# Patient Record
Sex: MALE | Race: WHITE | NOT HISPANIC OR LATINO | URBAN - METROPOLITAN AREA
[De-identification: names, ages, dates, MRNs, and addresses within clinical notes are randomized per-mention and may not be internally consistent; named-entity substitution may affect disease eponyms.]

---

## 2017-03-17 ENCOUNTER — COMMUNICATION - HEALTHEAST (OUTPATIENT)
Dept: SCHEDULING | Facility: CLINIC | Age: 82
End: 2017-03-17

## 2017-03-23 ENCOUNTER — OFFICE VISIT - HEALTHEAST (OUTPATIENT)
Dept: INTERNAL MEDICINE | Facility: CLINIC | Age: 82
End: 2017-03-23

## 2017-03-23 DIAGNOSIS — J40 BRONCHITIS: ICD-10-CM

## 2017-03-23 ASSESSMENT — MIFFLIN-ST. JEOR: SCORE: 1462.77

## 2017-06-15 ENCOUNTER — RECORDS - HEALTHEAST (OUTPATIENT)
Dept: ADMINISTRATIVE | Facility: OTHER | Age: 82
End: 2017-06-15

## 2017-07-25 ENCOUNTER — COMMUNICATION - HEALTHEAST (OUTPATIENT)
Dept: INTERNAL MEDICINE | Facility: CLINIC | Age: 82
End: 2017-07-25

## 2017-07-25 DIAGNOSIS — I10 HYPERTENSION: ICD-10-CM

## 2017-10-04 ENCOUNTER — COMMUNICATION - HEALTHEAST (OUTPATIENT)
Dept: SCHEDULING | Facility: CLINIC | Age: 82
End: 2017-10-04

## 2017-10-04 ENCOUNTER — OFFICE VISIT - HEALTHEAST (OUTPATIENT)
Dept: INTERNAL MEDICINE | Facility: CLINIC | Age: 82
End: 2017-10-04

## 2017-10-04 ENCOUNTER — HOSPITAL ENCOUNTER (OUTPATIENT)
Dept: LAB | Age: 82
Setting detail: SPECIMEN
Discharge: HOME | End: 2017-10-04

## 2017-10-04 DIAGNOSIS — I50.9 CONGESTIVE HEART FAILURE (H): ICD-10-CM

## 2017-10-04 DIAGNOSIS — R42 DIZZINESS: ICD-10-CM

## 2017-10-04 DIAGNOSIS — I10 ESSENTIAL HYPERTENSION: ICD-10-CM

## 2017-10-05 ENCOUNTER — COMMUNICATION - HEALTHEAST (OUTPATIENT)
Dept: INTERNAL MEDICINE | Facility: CLINIC | Age: 82
End: 2017-10-05

## 2017-10-11 ENCOUNTER — OFFICE VISIT - HEALTHEAST (OUTPATIENT)
Dept: INTERNAL MEDICINE | Facility: CLINIC | Age: 82
End: 2017-10-11

## 2017-10-11 DIAGNOSIS — I50.9 CONGESTIVE HEART FAILURE (H): ICD-10-CM

## 2017-10-11 DIAGNOSIS — R42 DIZZINESS: ICD-10-CM

## 2017-10-12 ENCOUNTER — COMMUNICATION - HEALTHEAST (OUTPATIENT)
Dept: INTERNAL MEDICINE | Facility: CLINIC | Age: 82
End: 2017-10-12

## 2017-10-16 ENCOUNTER — COMMUNICATION - HEALTHEAST (OUTPATIENT)
Dept: SCHEDULING | Facility: CLINIC | Age: 82
End: 2017-10-16

## 2017-10-18 ENCOUNTER — OFFICE VISIT - HEALTHEAST (OUTPATIENT)
Dept: INTERNAL MEDICINE | Facility: CLINIC | Age: 82
End: 2017-10-18

## 2017-10-18 DIAGNOSIS — I50.9 CHF (CONGESTIVE HEART FAILURE) (H): ICD-10-CM

## 2017-10-18 DIAGNOSIS — I10 ESSENTIAL HYPERTENSION: ICD-10-CM

## 2017-10-19 ENCOUNTER — COMMUNICATION - HEALTHEAST (OUTPATIENT)
Dept: INTERNAL MEDICINE | Facility: CLINIC | Age: 82
End: 2017-10-19

## 2017-10-26 ENCOUNTER — COMMUNICATION - HEALTHEAST (OUTPATIENT)
Dept: INTERNAL MEDICINE | Facility: CLINIC | Age: 82
End: 2017-10-26

## 2017-10-26 DIAGNOSIS — I50.9 CONGESTIVE HEART FAILURE (H): ICD-10-CM

## 2017-11-17 ENCOUNTER — OFFICE VISIT - HEALTHEAST (OUTPATIENT)
Dept: INTERNAL MEDICINE | Facility: CLINIC | Age: 82
End: 2017-11-17

## 2017-11-17 ENCOUNTER — COMMUNICATION - HEALTHEAST (OUTPATIENT)
Dept: INTERNAL MEDICINE | Facility: CLINIC | Age: 82
End: 2017-11-17

## 2017-11-17 DIAGNOSIS — I50.43 ACUTE ON CHRONIC COMBINED SYSTOLIC AND DIASTOLIC CONGESTIVE HEART FAILURE (H): ICD-10-CM

## 2017-11-17 DIAGNOSIS — Z79.899 ENCOUNTER FOR MEDICATION MANAGEMENT: ICD-10-CM

## 2017-11-17 DIAGNOSIS — I10 ESSENTIAL HYPERTENSION: ICD-10-CM

## 2017-12-15 ENCOUNTER — RECORDS - HEALTHEAST (OUTPATIENT)
Dept: ADMINISTRATIVE | Facility: OTHER | Age: 82
End: 2017-12-15

## 2017-12-15 ENCOUNTER — COMMUNICATION - HEALTHEAST (OUTPATIENT)
Dept: INTERNAL MEDICINE | Facility: CLINIC | Age: 82
End: 2017-12-15

## 2017-12-15 DIAGNOSIS — I50.9 CONGESTIVE HEART FAILURE (H): ICD-10-CM

## 2017-12-15 DIAGNOSIS — D64.9 ANEMIA: ICD-10-CM

## 2017-12-15 RX ORDER — NITROGLYCERIN 0.4 MG/1
0.4 TABLET SUBLINGUAL EVERY 5 MIN PRN
Qty: 30 TABLET | Status: SHIPPED | OUTPATIENT
Start: 2017-12-15

## 2017-12-15 RX ORDER — DOCUSATE SODIUM 100 MG/1
100 CAPSULE, LIQUID FILLED ORAL 2 TIMES DAILY
Qty: 30 CAPSULE | Refills: 3 | Status: SHIPPED | OUTPATIENT
Start: 2017-12-15

## 2018-02-13 ENCOUNTER — COMMUNICATION - HEALTHEAST (OUTPATIENT)
Dept: INTERNAL MEDICINE | Facility: CLINIC | Age: 83
End: 2018-02-13

## 2018-02-13 ENCOUNTER — OFFICE VISIT - HEALTHEAST (OUTPATIENT)
Dept: INTERNAL MEDICINE | Facility: CLINIC | Age: 83
End: 2018-02-13

## 2018-02-13 DIAGNOSIS — I50.43 ACUTE ON CHRONIC COMBINED SYSTOLIC AND DIASTOLIC CONGESTIVE HEART FAILURE (H): ICD-10-CM

## 2018-02-13 DIAGNOSIS — K64.9 HEMORRHOID: ICD-10-CM

## 2018-02-13 DIAGNOSIS — K62.89 RECTAL IRRITATION: ICD-10-CM

## 2018-02-13 LAB
ANION GAP SERPL CALCULATED.3IONS-SCNC: 9 MMOL/L (ref 5–18)
BUN SERPL-MCNC: 30 MG/DL (ref 8–28)
CALCIUM SERPL-MCNC: 9.6 MG/DL (ref 8.5–10.5)
CHLORIDE BLD-SCNC: 102 MMOL/L (ref 98–107)
CO2 SERPL-SCNC: 28 MMOL/L (ref 22–31)
CREAT SERPL-MCNC: 1.55 MG/DL (ref 0.7–1.3)
ERYTHROCYTE [DISTWIDTH] IN BLOOD BY AUTOMATED COUNT: 12.5 % (ref 11–14.5)
GFR SERPL CREATININE-BSD FRML MDRD: 43 ML/MIN/1.73M2
GLUCOSE BLD-MCNC: 98 MG/DL (ref 70–125)
HCT VFR BLD AUTO: 35.8 % (ref 40–54)
HGB BLD-MCNC: 12 G/DL (ref 14–18)
MCH RBC QN AUTO: 30.8 PG (ref 27–34)
MCHC RBC AUTO-ENTMCNC: 33.6 G/DL (ref 32–36)
MCV RBC AUTO: 92 FL (ref 80–100)
PLATELET # BLD AUTO: 181 THOU/UL (ref 140–440)
PMV BLD AUTO: 7 FL (ref 7–10)
POTASSIUM BLD-SCNC: 4.4 MMOL/L (ref 3.5–5)
RBC # BLD AUTO: 3.9 MILL/UL (ref 4.4–6.2)
SODIUM SERPL-SCNC: 139 MMOL/L (ref 136–145)
WBC: 7.3 THOU/UL (ref 4–11)

## 2018-02-14 ENCOUNTER — COMMUNICATION - HEALTHEAST (OUTPATIENT)
Dept: INTERNAL MEDICINE | Facility: CLINIC | Age: 83
End: 2018-02-14

## 2018-03-05 ENCOUNTER — COMMUNICATION - HEALTHEAST (OUTPATIENT)
Dept: INTERNAL MEDICINE | Facility: CLINIC | Age: 83
End: 2018-03-05

## 2018-03-14 ENCOUNTER — RECORDS - HEALTHEAST (OUTPATIENT)
Dept: ADMINISTRATIVE | Facility: OTHER | Age: 83
End: 2018-03-14

## 2018-05-17 ENCOUNTER — COMMUNICATION - HEALTHEAST (OUTPATIENT)
Dept: INTERNAL MEDICINE | Facility: CLINIC | Age: 83
End: 2018-05-17

## 2018-05-17 DIAGNOSIS — E78.5 HYPERLIPIDEMIA: ICD-10-CM

## 2018-05-17 DIAGNOSIS — I25.10 CAD (CORONARY ARTERY DISEASE): ICD-10-CM

## 2018-09-06 ENCOUNTER — COMMUNICATION - HEALTHEAST (OUTPATIENT)
Dept: INTERNAL MEDICINE | Facility: CLINIC | Age: 83
End: 2018-09-06

## 2018-09-06 DIAGNOSIS — E78.5 HYPERLIPIDEMIA: ICD-10-CM

## 2018-09-11 ENCOUNTER — COMMUNICATION - HEALTHEAST (OUTPATIENT)
Dept: INTERNAL MEDICINE | Facility: CLINIC | Age: 83
End: 2018-09-11

## 2018-09-11 DIAGNOSIS — I50.9 CONGESTIVE HEART FAILURE (H): ICD-10-CM

## 2018-09-12 ENCOUNTER — RECORDS - HEALTHEAST (OUTPATIENT)
Dept: ADMINISTRATIVE | Facility: OTHER | Age: 83
End: 2018-09-12

## 2018-09-13 ENCOUNTER — COMMUNICATION - HEALTHEAST (OUTPATIENT)
Dept: INTERNAL MEDICINE | Facility: CLINIC | Age: 83
End: 2018-09-13

## 2018-09-13 DIAGNOSIS — I50.9 CONGESTIVE HEART FAILURE (H): ICD-10-CM

## 2018-10-04 ENCOUNTER — OFFICE VISIT - HEALTHEAST (OUTPATIENT)
Dept: INTERNAL MEDICINE | Facility: CLINIC | Age: 83
End: 2018-10-04

## 2018-10-04 DIAGNOSIS — I48.91 ATRIAL FIBRILLATION, UNSPECIFIED TYPE (H): ICD-10-CM

## 2018-10-04 DIAGNOSIS — I50.22 CHRONIC SYSTOLIC HEART FAILURE (H): ICD-10-CM

## 2018-10-04 DIAGNOSIS — Z23 FLU VACCINE NEED: ICD-10-CM

## 2018-10-04 DIAGNOSIS — I10 ESSENTIAL HYPERTENSION: ICD-10-CM

## 2018-11-17 ENCOUNTER — COMMUNICATION - HEALTHEAST (OUTPATIENT)
Dept: INTERNAL MEDICINE | Facility: CLINIC | Age: 83
End: 2018-11-17

## 2018-11-17 DIAGNOSIS — E78.5 HYPERLIPIDEMIA: ICD-10-CM

## 2019-02-06 ENCOUNTER — COMMUNICATION - HEALTHEAST (OUTPATIENT)
Dept: INTERNAL MEDICINE | Facility: CLINIC | Age: 84
End: 2019-02-06

## 2019-02-06 DIAGNOSIS — I10 HTN (HYPERTENSION): ICD-10-CM

## 2019-02-06 DIAGNOSIS — E78.5 HYPERLIPIDEMIA: ICD-10-CM

## 2019-02-06 DIAGNOSIS — E87.6 HYPOKALEMIA: ICD-10-CM

## 2019-03-12 ENCOUNTER — COMMUNICATION - HEALTHEAST (OUTPATIENT)
Dept: INTERNAL MEDICINE | Facility: CLINIC | Age: 84
End: 2019-03-12

## 2019-04-25 ENCOUNTER — COMMUNICATION - HEALTHEAST (OUTPATIENT)
Dept: INTERNAL MEDICINE | Facility: CLINIC | Age: 84
End: 2019-04-25

## 2019-04-25 DIAGNOSIS — I10 BENIGN ESSENTIAL HYPERTENSION: ICD-10-CM

## 2019-05-01 ENCOUNTER — OFFICE VISIT - HEALTHEAST (OUTPATIENT)
Dept: INTERNAL MEDICINE | Facility: CLINIC | Age: 84
End: 2019-05-01

## 2019-05-01 DIAGNOSIS — D64.9 ANEMIA, UNSPECIFIED TYPE: ICD-10-CM

## 2019-05-01 DIAGNOSIS — R68.83 CHILLS: ICD-10-CM

## 2019-05-01 DIAGNOSIS — R63.4 WEIGHT LOSS: ICD-10-CM

## 2019-05-01 DIAGNOSIS — I48.91 ATRIAL FIBRILLATION, UNSPECIFIED TYPE (H): ICD-10-CM

## 2019-05-01 DIAGNOSIS — Z00.00 ENCOUNTER FOR MEDICARE ANNUAL WELLNESS EXAM: ICD-10-CM

## 2019-05-01 DIAGNOSIS — H54.3 VISUAL LOSS, BILATERAL: ICD-10-CM

## 2019-05-01 DIAGNOSIS — I50.22 CHRONIC SYSTOLIC HEART FAILURE (H): ICD-10-CM

## 2019-05-01 LAB
ALBUMIN SERPL-MCNC: 4.5 G/DL (ref 3.5–5)
ALP SERPL-CCNC: 81 U/L (ref 45–120)
ALT SERPL W P-5'-P-CCNC: 16 U/L (ref 0–45)
ANION GAP SERPL CALCULATED.3IONS-SCNC: 12 MMOL/L (ref 5–18)
AST SERPL W P-5'-P-CCNC: 21 U/L (ref 0–40)
BASOPHILS # BLD AUTO: 0.1 THOU/UL (ref 0–0.2)
BASOPHILS NFR BLD AUTO: 1 % (ref 0–2)
BILIRUB SERPL-MCNC: 1.6 MG/DL (ref 0–1)
BUN SERPL-MCNC: 33 MG/DL (ref 8–28)
CALCIUM SERPL-MCNC: 10.2 MG/DL (ref 8.5–10.5)
CHLORIDE BLD-SCNC: 103 MMOL/L (ref 98–107)
CO2 SERPL-SCNC: 24 MMOL/L (ref 22–31)
CREAT SERPL-MCNC: 1.65 MG/DL (ref 0.7–1.3)
EOSINOPHIL # BLD AUTO: 1.1 THOU/UL (ref 0–0.4)
EOSINOPHIL NFR BLD AUTO: 14 % (ref 0–6)
ERYTHROCYTE [DISTWIDTH] IN BLOOD BY AUTOMATED COUNT: 11.6 % (ref 11–14.5)
FERRITIN SERPL-MCNC: 68 NG/ML (ref 27–300)
GFR SERPL CREATININE-BSD FRML MDRD: 40 ML/MIN/1.73M2
GLUCOSE BLD-MCNC: 100 MG/DL (ref 70–125)
HCT VFR BLD AUTO: 38.5 % (ref 40–54)
HGB BLD-MCNC: 13 G/DL (ref 14–18)
IRON SATN MFR SERPL: 19 % (ref 20–50)
IRON SERPL-MCNC: 78 UG/DL (ref 42–175)
LYMPHOCYTES # BLD AUTO: 1.9 THOU/UL (ref 0.8–4.4)
LYMPHOCYTES NFR BLD AUTO: 23 % (ref 20–40)
MCH RBC QN AUTO: 32.9 PG (ref 27–34)
MCHC RBC AUTO-ENTMCNC: 33.9 G/DL (ref 32–36)
MCV RBC AUTO: 97 FL (ref 80–100)
MONOCYTES # BLD AUTO: 1.1 THOU/UL (ref 0–0.9)
MONOCYTES NFR BLD AUTO: 13 % (ref 2–10)
NEUTROPHILS # BLD AUTO: 4.1 THOU/UL (ref 2–7.7)
NEUTROPHILS NFR BLD AUTO: 50 % (ref 50–70)
PLATELET # BLD AUTO: 143 THOU/UL (ref 140–440)
PMV BLD AUTO: 7.3 FL (ref 7–10)
POTASSIUM BLD-SCNC: 4.5 MMOL/L (ref 3.5–5)
PROT SERPL-MCNC: 7.6 G/DL (ref 6–8)
RBC # BLD AUTO: 3.96 MILL/UL (ref 4.4–6.2)
SODIUM SERPL-SCNC: 139 MMOL/L (ref 136–145)
T4 FREE SERPL-MCNC: 0.9 NG/DL (ref 0.7–1.8)
TIBC SERPL-MCNC: 412 UG/DL (ref 313–563)
TRANSFERRIN SERPL-MCNC: 329 MG/DL (ref 212–360)
TSH SERPL DL<=0.005 MIU/L-ACNC: 4.19 UIU/ML (ref 0.3–5)
WBC: 8.2 THOU/UL (ref 4–11)

## 2019-05-01 ASSESSMENT — MIFFLIN-ST. JEOR: SCORE: 1404.71

## 2019-05-08 ENCOUNTER — COMMUNICATION - HEALTHEAST (OUTPATIENT)
Dept: INTERNAL MEDICINE | Facility: CLINIC | Age: 84
End: 2019-05-08

## 2019-07-24 ENCOUNTER — COMMUNICATION - HEALTHEAST (OUTPATIENT)
Dept: INTERNAL MEDICINE | Facility: CLINIC | Age: 84
End: 2019-07-24

## 2019-07-24 DIAGNOSIS — I10 BENIGN ESSENTIAL HYPERTENSION: ICD-10-CM

## 2019-09-02 ENCOUNTER — COMMUNICATION - HEALTHEAST (OUTPATIENT)
Dept: SCHEDULING | Facility: CLINIC | Age: 84
End: 2019-09-02

## 2019-09-02 DIAGNOSIS — J06.9 URI (UPPER RESPIRATORY INFECTION): ICD-10-CM

## 2019-09-03 ENCOUNTER — OFFICE VISIT - HEALTHEAST (OUTPATIENT)
Dept: INTERNAL MEDICINE | Facility: CLINIC | Age: 84
End: 2019-09-03

## 2019-09-03 DIAGNOSIS — J06.9 URI (UPPER RESPIRATORY INFECTION): ICD-10-CM

## 2019-09-03 DIAGNOSIS — J22 LOWER RESPIRATORY INFECTION: ICD-10-CM

## 2019-09-20 ENCOUNTER — COMMUNICATION - HEALTHEAST (OUTPATIENT)
Dept: INTERNAL MEDICINE | Facility: CLINIC | Age: 84
End: 2019-09-20

## 2019-09-20 DIAGNOSIS — I50.9 CONGESTIVE HEART FAILURE (H): ICD-10-CM

## 2019-09-23 ENCOUNTER — AMBULATORY - HEALTHEAST (OUTPATIENT)
Dept: NURSING | Facility: CLINIC | Age: 84
End: 2019-09-23

## 2019-09-23 DIAGNOSIS — Z23 FLU VACCINE NEED: ICD-10-CM

## 2019-10-22 ENCOUNTER — COMMUNICATION - HEALTHEAST (OUTPATIENT)
Dept: INTERNAL MEDICINE | Facility: CLINIC | Age: 84
End: 2019-10-22

## 2019-10-22 DIAGNOSIS — I50.22 CHRONIC SYSTOLIC HEART FAILURE (H): ICD-10-CM

## 2020-01-20 ENCOUNTER — COMMUNICATION - HEALTHEAST (OUTPATIENT)
Dept: INTERNAL MEDICINE | Facility: CLINIC | Age: 85
End: 2020-01-20

## 2020-01-20 DIAGNOSIS — I10 HTN (HYPERTENSION): ICD-10-CM

## 2020-01-20 DIAGNOSIS — E78.5 HYPERLIPIDEMIA: ICD-10-CM

## 2020-01-20 DIAGNOSIS — E87.6 HYPOKALEMIA: ICD-10-CM

## 2020-04-23 ENCOUNTER — COMMUNICATION - HEALTHEAST (OUTPATIENT)
Dept: INTERNAL MEDICINE | Facility: CLINIC | Age: 85
End: 2020-04-23

## 2020-04-23 DIAGNOSIS — E87.6 HYPOKALEMIA: ICD-10-CM

## 2020-04-23 DIAGNOSIS — I10 HTN (HYPERTENSION): ICD-10-CM

## 2020-04-23 DIAGNOSIS — E78.5 HYPERLIPIDEMIA: ICD-10-CM

## 2020-05-18 ENCOUNTER — COMMUNICATION - HEALTHEAST (OUTPATIENT)
Dept: INTERNAL MEDICINE | Facility: CLINIC | Age: 85
End: 2020-05-18

## 2020-05-18 DIAGNOSIS — I10 BENIGN ESSENTIAL HYPERTENSION: ICD-10-CM

## 2020-06-25 ENCOUNTER — COMMUNICATION - HEALTHEAST (OUTPATIENT)
Dept: INTERNAL MEDICINE | Facility: CLINIC | Age: 85
End: 2020-06-25

## 2020-06-25 DIAGNOSIS — I50.9 CONGESTIVE HEART FAILURE (H): ICD-10-CM

## 2020-07-09 ENCOUNTER — COMMUNICATION - HEALTHEAST (OUTPATIENT)
Dept: INTERNAL MEDICINE | Facility: CLINIC | Age: 85
End: 2020-07-09

## 2020-07-09 ENCOUNTER — OFFICE VISIT - HEALTHEAST (OUTPATIENT)
Dept: INTERNAL MEDICINE | Facility: CLINIC | Age: 85
End: 2020-07-09

## 2020-07-09 DIAGNOSIS — L30.9 DERMATITIS: ICD-10-CM

## 2020-07-09 DIAGNOSIS — R21 RASH: ICD-10-CM

## 2020-08-14 ENCOUNTER — COMMUNICATION - HEALTHEAST (OUTPATIENT)
Dept: INTERNAL MEDICINE | Facility: CLINIC | Age: 85
End: 2020-08-14

## 2020-08-14 DIAGNOSIS — E78.5 HYPERLIPIDEMIA: ICD-10-CM

## 2020-08-14 DIAGNOSIS — I10 HTN (HYPERTENSION): ICD-10-CM

## 2020-08-16 ENCOUNTER — COMMUNICATION - HEALTHEAST (OUTPATIENT)
Dept: INTERNAL MEDICINE | Facility: CLINIC | Age: 85
End: 2020-08-16

## 2020-08-16 DIAGNOSIS — E87.6 HYPOKALEMIA: ICD-10-CM

## 2020-09-18 ENCOUNTER — COMMUNICATION - HEALTHEAST (OUTPATIENT)
Dept: INTERNAL MEDICINE | Facility: CLINIC | Age: 85
End: 2020-09-18

## 2020-09-21 ENCOUNTER — OFFICE VISIT - HEALTHEAST (OUTPATIENT)
Dept: INTERNAL MEDICINE | Facility: CLINIC | Age: 85
End: 2020-09-21

## 2020-09-21 DIAGNOSIS — I50.9 CONGESTIVE HEART FAILURE (H): ICD-10-CM

## 2020-09-21 DIAGNOSIS — I25.10 CORONARY ATHEROSCLEROSIS: ICD-10-CM

## 2020-09-21 DIAGNOSIS — E87.6 HYPOKALEMIA: ICD-10-CM

## 2020-09-21 DIAGNOSIS — I50.22 CHRONIC SYSTOLIC HEART FAILURE (H): ICD-10-CM

## 2020-09-21 DIAGNOSIS — Z45.018 FITTING AND ADJUSTMENT OF CARDIAC PACEMAKER: ICD-10-CM

## 2020-09-21 DIAGNOSIS — D64.9 ANEMIA, UNSPECIFIED TYPE: ICD-10-CM

## 2020-09-21 LAB
ERYTHROCYTE [DISTWIDTH] IN BLOOD BY AUTOMATED COUNT: 14.2 % (ref 11–14.5)
HCT VFR BLD AUTO: 29.5 % (ref 40–54)
HGB BLD-MCNC: 9.6 G/DL (ref 14–18)
MCH RBC QN AUTO: 28.3 PG (ref 27–34)
MCHC RBC AUTO-ENTMCNC: 32.6 G/DL (ref 32–36)
MCV RBC AUTO: 87 FL (ref 80–100)
PLATELET # BLD AUTO: 213 THOU/UL (ref 140–440)
PMV BLD AUTO: 7.4 FL (ref 7–10)
RBC # BLD AUTO: 3.41 MILL/UL (ref 4.4–6.2)
WBC: 7.8 THOU/UL (ref 4–11)

## 2020-09-21 ASSESSMENT — MIFFLIN-ST. JEOR: SCORE: 1387.47

## 2020-09-28 ENCOUNTER — COMMUNICATION - HEALTHEAST (OUTPATIENT)
Dept: INTERNAL MEDICINE | Facility: CLINIC | Age: 85
End: 2020-09-28

## 2020-10-05 LAB
ALBUMIN SERPL-MCNC: 4 G/DL (ref 3.5–5)
ALP SERPL-CCNC: 83 U/L (ref 45–120)
ALT SERPL W P-5'-P-CCNC: 18 U/L (ref 0–45)
ANION GAP SERPL CALCULATED.3IONS-SCNC: 10 MMOL/L (ref 5–18)
AST SERPL W P-5'-P-CCNC: 17 U/L (ref 0–40)
BILIRUB SERPL-MCNC: 0.8 MG/DL (ref 0–1)
BUN SERPL-MCNC: 42 MG/DL (ref 8–28)
CALCIUM SERPL-MCNC: 9.3 MG/DL (ref 8.5–10.5)
CHLORIDE BLD-SCNC: 106 MMOL/L (ref 98–107)
CO2 SERPL-SCNC: 22 MMOL/L (ref 22–31)
CREAT SERPL-MCNC: 1.71 MG/DL (ref 0.7–1.3)
FERRITIN SERPL-MCNC: 37 NG/ML (ref 27–300)
GFR SERPL CREATININE-BSD FRML MDRD: 38 ML/MIN/1.73M2
GLUCOSE BLD-MCNC: 118 MG/DL (ref 70–125)
IRON SATN MFR SERPL: 10 % (ref 20–50)
IRON SERPL-MCNC: 39 UG/DL (ref 42–175)
POTASSIUM BLD-SCNC: 4.1 MMOL/L (ref 3.5–5)
PROT SERPL-MCNC: 7.1 G/DL (ref 6–8)
SODIUM SERPL-SCNC: 138 MMOL/L (ref 136–145)
TIBC SERPL-MCNC: 385 UG/DL (ref 313–563)
TRANSFERRIN SERPL-MCNC: 308 MG/DL (ref 212–360)
VIT B12 SERPL-MCNC: 305 PG/ML (ref 213–816)

## 2020-10-21 ENCOUNTER — COMMUNICATION - HEALTHEAST (OUTPATIENT)
Dept: INTERNAL MEDICINE | Facility: CLINIC | Age: 85
End: 2020-10-21

## 2020-10-21 ENCOUNTER — RECORDS - HEALTHEAST (OUTPATIENT)
Dept: ADMINISTRATIVE | Facility: OTHER | Age: 85
End: 2020-10-21

## 2020-10-21 DIAGNOSIS — I50.22 CHRONIC SYSTOLIC HEART FAILURE (H): ICD-10-CM

## 2020-10-21 RX ORDER — CARVEDILOL 6.25 MG/1
6.25 TABLET ORAL 2 TIMES DAILY WITH MEALS
Refills: 0 | Status: SHIPPED
Start: 2020-10-21

## 2020-11-19 LAB
CREAT SERPL-MCNC: 1.88 MG/DL (ref 0.72–1.25)
GFR ESTIMATE EXT - HISTORICAL: 34 ML/MIN/1.73M2
GFR ESTIMATE, IF BLACK EXT - HISTORICAL: 41 ML/MIN/1.73M2

## 2020-11-24 ENCOUNTER — RECORDS - HEALTHEAST (OUTPATIENT)
Dept: ADMINISTRATIVE | Facility: OTHER | Age: 85
End: 2020-11-24

## 2020-12-02 ENCOUNTER — RECORDS - HEALTHEAST (OUTPATIENT)
Dept: HEALTH INFORMATION MANAGEMENT | Facility: CLINIC | Age: 85
End: 2020-12-02

## 2021-01-06 ENCOUNTER — OFFICE VISIT - HEALTHEAST (OUTPATIENT)
Dept: INTERNAL MEDICINE | Facility: CLINIC | Age: 86
End: 2021-01-06

## 2021-01-06 DIAGNOSIS — F41.9 ANXIETY: ICD-10-CM

## 2021-01-06 DIAGNOSIS — I10 ESSENTIAL HYPERTENSION: ICD-10-CM

## 2021-01-06 DIAGNOSIS — I50.22 CHRONIC SYSTOLIC HEART FAILURE (H): ICD-10-CM

## 2021-01-06 DIAGNOSIS — H61.23 BILATERAL IMPACTED CERUMEN: ICD-10-CM

## 2021-01-06 LAB
ALBUMIN SERPL-MCNC: 3.8 G/DL (ref 3.5–5)
ALP SERPL-CCNC: 68 U/L (ref 45–120)
ALT SERPL W P-5'-P-CCNC: 11 U/L (ref 0–45)
ANION GAP SERPL CALCULATED.3IONS-SCNC: 11 MMOL/L (ref 5–18)
AST SERPL W P-5'-P-CCNC: 12 U/L (ref 0–40)
BASOPHILS # BLD AUTO: 0 THOU/UL (ref 0–0.2)
BASOPHILS NFR BLD AUTO: 1 % (ref 0–2)
BILIRUB SERPL-MCNC: 1.1 MG/DL (ref 0–1)
BUN SERPL-MCNC: 43 MG/DL (ref 8–28)
CALCIUM SERPL-MCNC: 9.5 MG/DL (ref 8.5–10.5)
CHLORIDE BLD-SCNC: 104 MMOL/L (ref 98–107)
CO2 SERPL-SCNC: 21 MMOL/L (ref 22–31)
CREAT SERPL-MCNC: 2.1 MG/DL (ref 0.7–1.3)
EOSINOPHIL # BLD AUTO: 0.5 THOU/UL (ref 0–0.4)
EOSINOPHIL NFR BLD AUTO: 7 % (ref 0–6)
ERYTHROCYTE [DISTWIDTH] IN BLOOD BY AUTOMATED COUNT: 15.9 % (ref 11–14.5)
GFR SERPL CREATININE-BSD FRML MDRD: 30 ML/MIN/1.73M2
GLUCOSE BLD-MCNC: 108 MG/DL (ref 70–125)
HCT VFR BLD AUTO: 24.5 % (ref 40–54)
HGB BLD-MCNC: 7.8 G/DL (ref 14–18)
LYMPHOCYTES # BLD AUTO: 1.6 THOU/UL (ref 0.8–4.4)
LYMPHOCYTES NFR BLD AUTO: 22 % (ref 20–40)
MCH RBC QN AUTO: 23.2 PG (ref 27–34)
MCHC RBC AUTO-ENTMCNC: 32 G/DL (ref 32–36)
MCV RBC AUTO: 72 FL (ref 80–100)
MONOCYTES # BLD AUTO: 1 THOU/UL (ref 0–0.9)
MONOCYTES NFR BLD AUTO: 14 % (ref 2–10)
NEUTROPHILS # BLD AUTO: 4.1 THOU/UL (ref 2–7.7)
NEUTROPHILS NFR BLD AUTO: 56 % (ref 50–70)
PLATELET # BLD AUTO: 235 THOU/UL (ref 140–440)
PMV BLD AUTO: 8.1 FL (ref 7–10)
POTASSIUM BLD-SCNC: 4.5 MMOL/L (ref 3.5–5)
PROT SERPL-MCNC: 6.8 G/DL (ref 6–8)
RBC # BLD AUTO: 3.38 MILL/UL (ref 4.4–6.2)
SODIUM SERPL-SCNC: 136 MMOL/L (ref 136–145)
T4 FREE SERPL-MCNC: 0.9 NG/DL (ref 0.7–1.8)
TSH SERPL DL<=0.005 MIU/L-ACNC: 5.47 UIU/ML (ref 0.3–5)
WBC: 7.2 THOU/UL (ref 4–11)

## 2021-01-06 RX ORDER — ANTIOX #8/OM3/DHA/EPA/LUT/ZEAX 250-2.5 MG
1 CAPSULE ORAL 2 TIMES DAILY
Status: SHIPPED | COMMUNITY
Start: 2020-11-24

## 2021-01-06 ASSESSMENT — ANXIETY QUESTIONNAIRES
5. BEING SO RESTLESS THAT IT IS HARD TO SIT STILL: NOT AT ALL
4. TROUBLE RELAXING: NOT AT ALL
IF YOU CHECKED OFF ANY PROBLEMS ON THIS QUESTIONNAIRE, HOW DIFFICULT HAVE THESE PROBLEMS MADE IT FOR YOU TO DO YOUR WORK, TAKE CARE OF THINGS AT HOME, OR GET ALONG WITH OTHER PEOPLE: SOMEWHAT DIFFICULT
1. FEELING NERVOUS, ANXIOUS, OR ON EDGE: MORE THAN HALF THE DAYS
3. WORRYING TOO MUCH ABOUT DIFFERENT THINGS: MORE THAN HALF THE DAYS
GAD7 TOTAL SCORE: 9
7. FEELING AFRAID AS IF SOMETHING AWFUL MIGHT HAPPEN: SEVERAL DAYS
6. BECOMING EASILY ANNOYED OR IRRITABLE: MORE THAN HALF THE DAYS
2. NOT BEING ABLE TO STOP OR CONTROL WORRYING: MORE THAN HALF THE DAYS

## 2021-01-06 ASSESSMENT — PATIENT HEALTH QUESTIONNAIRE - PHQ9: SUM OF ALL RESPONSES TO PHQ QUESTIONS 1-9: 9

## 2021-01-07 ENCOUNTER — AMBULATORY - HEALTHEAST (OUTPATIENT)
Dept: INTERNAL MEDICINE | Facility: CLINIC | Age: 86
End: 2021-01-07

## 2021-01-07 DIAGNOSIS — D50.9 IRON DEFICIENCY ANEMIA, UNSPECIFIED IRON DEFICIENCY ANEMIA TYPE: ICD-10-CM

## 2021-01-11 ENCOUNTER — COMMUNICATION - HEALTHEAST (OUTPATIENT)
Dept: INTERNAL MEDICINE | Facility: CLINIC | Age: 86
End: 2021-01-11

## 2021-01-12 ENCOUNTER — AMBULATORY - HEALTHEAST (OUTPATIENT)
Dept: INFUSION THERAPY | Facility: HOSPITAL | Age: 86
End: 2021-01-12

## 2021-01-13 ENCOUNTER — INFUSION - HEALTHEAST (OUTPATIENT)
Dept: INFUSION THERAPY | Facility: HOSPITAL | Age: 86
End: 2021-01-13

## 2021-01-13 DIAGNOSIS — D64.9 ANEMIA: ICD-10-CM

## 2021-01-13 LAB
ABO/RH(D): NORMAL
ANTIBODY SCREEN: NEGATIVE

## 2021-01-14 ENCOUNTER — COMMUNICATION - HEALTHEAST (OUTPATIENT)
Dept: SCHEDULING | Facility: CLINIC | Age: 86
End: 2021-01-14

## 2021-01-14 LAB
BLD PROD TYP BPU: NORMAL
BLD PROD TYP BPU: NORMAL
BLOOD TYPE: 5100
BLOOD TYPE: 5100
CODING SYSTEM: NORMAL
CODING SYSTEM: NORMAL
COMPONENT (HISTORICAL CONVERSION): NORMAL
COMPONENT (HISTORICAL CONVERSION): NORMAL
CROSSMATCH: NORMAL
CROSSMATCH: NORMAL
ISSUE DATE AND TIME: NORMAL
ISSUE DATE AND TIME: NORMAL
STATUS (HISTORICAL CONVERSION): NORMAL
STATUS (HISTORICAL CONVERSION): NORMAL
UNIT ABO/RH (HISTORICAL CONVERSION): NORMAL
UNIT ABO/RH (HISTORICAL CONVERSION): NORMAL
UNIT NUMBER: NORMAL
UNIT NUMBER: NORMAL

## 2021-01-15 ENCOUNTER — COMMUNICATION - HEALTHEAST (OUTPATIENT)
Dept: SCHEDULING | Facility: CLINIC | Age: 86
End: 2021-01-15

## 2021-01-19 ENCOUNTER — COMMUNICATION - HEALTHEAST (OUTPATIENT)
Dept: INTERNAL MEDICINE | Facility: CLINIC | Age: 86
End: 2021-01-19

## 2021-01-19 ENCOUNTER — OFFICE VISIT - HEALTHEAST (OUTPATIENT)
Dept: PHARMACY | Facility: CLINIC | Age: 86
End: 2021-01-19

## 2021-01-19 DIAGNOSIS — I25.10 ATHEROSCLEROSIS OF CORONARY ARTERY WITHOUT ANGINA PECTORIS, UNSPECIFIED VESSEL OR LESION TYPE, UNSPECIFIED WHETHER NATIVE OR TRANSPLANTED HEART: ICD-10-CM

## 2021-01-19 DIAGNOSIS — E78.5 HYPERLIPIDEMIA: ICD-10-CM

## 2021-01-19 DIAGNOSIS — E55.9 VITAMIN D DEFICIENCY: ICD-10-CM

## 2021-01-19 DIAGNOSIS — I50.22 CHRONIC SYSTOLIC HEART FAILURE (H): ICD-10-CM

## 2021-01-19 DIAGNOSIS — F41.9 ANXIETY: ICD-10-CM

## 2021-01-19 DIAGNOSIS — L85.3 XEROSIS CUTIS: ICD-10-CM

## 2021-01-19 DIAGNOSIS — D50.9 IRON DEFICIENCY ANEMIA, UNSPECIFIED IRON DEFICIENCY ANEMIA TYPE: ICD-10-CM

## 2021-01-19 DIAGNOSIS — I10 HTN (HYPERTENSION): ICD-10-CM

## 2021-01-19 DIAGNOSIS — E87.6 HYPOKALEMIA: ICD-10-CM

## 2021-01-19 DIAGNOSIS — J30.2 SEASONAL ALLERGIC RHINITIS, UNSPECIFIED TRIGGER: ICD-10-CM

## 2021-01-19 PROCEDURE — 99607 MTMS BY PHARM ADDL 15 MIN: CPT | Performed by: PHARMACIST

## 2021-01-19 PROCEDURE — 99605 MTMS BY PHARM NP 15 MIN: CPT | Performed by: PHARMACIST

## 2021-01-19 RX ORDER — SIMVASTATIN 20 MG
20 TABLET ORAL AT BEDTIME
Qty: 90 TABLET | Refills: 1 | Status: SHIPPED | OUTPATIENT
Start: 2021-01-19

## 2021-01-19 RX ORDER — SPIRONOLACTONE 25 MG/1
25 TABLET ORAL DAILY
Qty: 90 TABLET | Refills: 1 | Status: SHIPPED | OUTPATIENT
Start: 2021-01-19

## 2021-01-19 RX ORDER — POLYETHYLENE GLYCOL 3350 17 G/17G
17 POWDER, FOR SOLUTION ORAL DAILY
Status: SHIPPED | COMMUNITY
Start: 2021-01-19

## 2021-01-19 RX ORDER — CARBOXYMETHYLCELLULOSE SODIUM 5 MG/ML
1 SOLUTION/ DROPS OPHTHALMIC 4 TIMES DAILY PRN
Status: SHIPPED | COMMUNITY
Start: 2021-01-19

## 2021-01-19 RX ORDER — LOSARTAN POTASSIUM 25 MG/1
25 TABLET ORAL DAILY
Status: SHIPPED | COMMUNITY
Start: 2021-01-19

## 2021-01-19 RX ORDER — MOMETASONE FUROATE 1 MG/G
1 CREAM TOPICAL PRN
Status: SHIPPED | COMMUNITY
Start: 2021-01-19

## 2021-01-20 ENCOUNTER — OFFICE VISIT - HEALTHEAST (OUTPATIENT)
Dept: INTERNAL MEDICINE | Facility: CLINIC | Age: 86
End: 2021-01-20

## 2021-01-20 DIAGNOSIS — N18.31 CHRONIC RENAL FAILURE, STAGE 3A (H): ICD-10-CM

## 2021-01-20 DIAGNOSIS — I50.22 CHRONIC SYSTOLIC HEART FAILURE (H): ICD-10-CM

## 2021-01-20 DIAGNOSIS — D50.9 IRON DEFICIENCY ANEMIA, UNSPECIFIED IRON DEFICIENCY ANEMIA TYPE: ICD-10-CM

## 2021-01-20 ASSESSMENT — MIFFLIN-ST. JEOR: SCORE: 1385.2

## 2021-01-24 RX ORDER — LORATADINE 10 MG/1
10 TABLET ORAL DAILY PRN
Qty: 90 TABLET | Refills: 3 | Status: SHIPPED
Start: 2021-01-24

## 2021-02-03 ENCOUNTER — COMMUNICATION - HEALTHEAST (OUTPATIENT)
Dept: SCHEDULING | Facility: CLINIC | Age: 86
End: 2021-02-03

## 2021-02-03 DIAGNOSIS — I25.10 ATHEROSCLEROSIS OF CORONARY ARTERY WITHOUT ANGINA PECTORIS, UNSPECIFIED VESSEL OR LESION TYPE, UNSPECIFIED WHETHER NATIVE OR TRANSPLANTED HEART: ICD-10-CM

## 2021-03-02 ENCOUNTER — COMMUNICATION - HEALTHEAST (OUTPATIENT)
Dept: INTERNAL MEDICINE | Facility: CLINIC | Age: 86
End: 2021-03-02

## 2021-03-19 ENCOUNTER — COMMUNICATION - HEALTHEAST (OUTPATIENT)
Dept: ADMINISTRATIVE | Facility: CLINIC | Age: 86
End: 2021-03-19

## 2021-04-23 ENCOUNTER — OFFICE VISIT - HEALTHEAST (OUTPATIENT)
Dept: FAMILY MEDICINE | Facility: CLINIC | Age: 86
End: 2021-04-23

## 2021-04-23 ENCOUNTER — COMMUNICATION - HEALTHEAST (OUTPATIENT)
Dept: SCHEDULING | Facility: CLINIC | Age: 86
End: 2021-04-23

## 2021-04-23 DIAGNOSIS — T14.8XXA INFECTED ABRASION: ICD-10-CM

## 2021-04-23 DIAGNOSIS — L08.9 INFECTED ABRASION: ICD-10-CM

## 2021-04-23 RX ORDER — FERROUS SULFATE 325(65) MG
325 TABLET ORAL
Status: SHIPPED | COMMUNITY
Start: 2021-02-16

## 2021-05-20 ENCOUNTER — COMMUNICATION - HEALTHEAST (OUTPATIENT)
Dept: SCHEDULING | Facility: CLINIC | Age: 86
End: 2021-05-20

## 2021-05-20 ENCOUNTER — NURSE TRIAGE (OUTPATIENT)
Dept: NURSING | Facility: CLINIC | Age: 86
End: 2021-05-20

## 2021-05-24 ENCOUNTER — OFFICE VISIT - HEALTHEAST (OUTPATIENT)
Dept: INTERNAL MEDICINE | Facility: CLINIC | Age: 86
End: 2021-05-24

## 2021-05-24 ENCOUNTER — COMMUNICATION - HEALTHEAST (OUTPATIENT)
Dept: INTERNAL MEDICINE | Facility: CLINIC | Age: 86
End: 2021-05-24

## 2021-05-24 DIAGNOSIS — I10 ESSENTIAL HYPERTENSION: ICD-10-CM

## 2021-05-24 DIAGNOSIS — I25.10 ATHEROSCLEROSIS OF CORONARY ARTERY WITHOUT ANGINA PECTORIS, UNSPECIFIED VESSEL OR LESION TYPE, UNSPECIFIED WHETHER NATIVE OR TRANSPLANTED HEART: ICD-10-CM

## 2021-05-24 DIAGNOSIS — I50.22 CHRONIC SYSTOLIC HEART FAILURE (H): ICD-10-CM

## 2021-05-24 DIAGNOSIS — G47.9 SLEEP DISORDER: ICD-10-CM

## 2021-05-24 DIAGNOSIS — M06.042 RHEUMATOID ARTHRITIS INVOLVING BOTH HANDS WITH NEGATIVE RHEUMATOID FACTOR (H): ICD-10-CM

## 2021-05-24 DIAGNOSIS — M06.041 RHEUMATOID ARTHRITIS INVOLVING BOTH HANDS WITH NEGATIVE RHEUMATOID FACTOR (H): ICD-10-CM

## 2021-05-24 RX ORDER — MIRTAZAPINE 7.5 MG/1
15 TABLET, FILM COATED ORAL AT BEDTIME
Refills: 0 | Status: SHIPPED
Start: 2021-05-24

## 2021-05-24 RX ORDER — ISOSORBIDE MONONITRATE 30 MG/1
15 TABLET, EXTENDED RELEASE ORAL DAILY
Qty: 90 TABLET | Refills: 3 | Status: SHIPPED
Start: 2021-05-24

## 2021-05-24 ASSESSMENT — ANXIETY QUESTIONNAIRES
GAD7 TOTAL SCORE: 10
2. NOT BEING ABLE TO STOP OR CONTROL WORRYING: MORE THAN HALF THE DAYS
3. WORRYING TOO MUCH ABOUT DIFFERENT THINGS: SEVERAL DAYS
IF YOU CHECKED OFF ANY PROBLEMS ON THIS QUESTIONNAIRE, HOW DIFFICULT HAVE THESE PROBLEMS MADE IT FOR YOU TO DO YOUR WORK, TAKE CARE OF THINGS AT HOME, OR GET ALONG WITH OTHER PEOPLE: SOMEWHAT DIFFICULT
5. BEING SO RESTLESS THAT IT IS HARD TO SIT STILL: NOT AT ALL
1. FEELING NERVOUS, ANXIOUS, OR ON EDGE: NEARLY EVERY DAY
6. BECOMING EASILY ANNOYED OR IRRITABLE: NEARLY EVERY DAY
4. TROUBLE RELAXING: SEVERAL DAYS
7. FEELING AFRAID AS IF SOMETHING AWFUL MIGHT HAPPEN: NOT AT ALL

## 2021-05-24 ASSESSMENT — PATIENT HEALTH QUESTIONNAIRE - PHQ9: SUM OF ALL RESPONSES TO PHQ QUESTIONS 1-9: 8

## 2021-05-25 ENCOUNTER — RECORDS - HEALTHEAST (OUTPATIENT)
Dept: ADMINISTRATIVE | Facility: CLINIC | Age: 86
End: 2021-05-25

## 2021-05-26 ENCOUNTER — RECORDS - HEALTHEAST (OUTPATIENT)
Dept: ADMINISTRATIVE | Facility: CLINIC | Age: 86
End: 2021-05-26

## 2021-05-27 VITALS
OXYGEN SATURATION: 100 % | SYSTOLIC BLOOD PRESSURE: 101 MMHG | TEMPERATURE: 97.5 F | DIASTOLIC BLOOD PRESSURE: 61 MMHG | RESPIRATION RATE: 16 BRPM | HEART RATE: 70 BPM

## 2021-05-27 ASSESSMENT — PATIENT HEALTH QUESTIONNAIRE - PHQ9: SUM OF ALL RESPONSES TO PHQ QUESTIONS 1-9: 9

## 2021-05-28 ASSESSMENT — ANXIETY QUESTIONNAIRES: GAD7 TOTAL SCORE: 9

## 2021-05-28 NOTE — TELEPHONE ENCOUNTER
RN cannot approve Refill Request    RN can NOT refill this medication too soon to refill. Last office visit: 10/4/2018 Ashish Morley MD Last Physical: Visit date not found Last MTM visit: Visit date not found Last visit same specialty: 10/4/2018 Ashish Morley MD.  Next visit within 3 mo: Visit date not found  Next physical within 3 mo: Visit date not found  Last refill was 3/13/19 for 90 tabs.  Is taken once daily.  Overdue for labs as noted.  Last OV 10/4/2018.  Has OV for 5/1/19    Ellie Tran, [facility] Triage/Med Refill 4/28/2019    Requested Prescriptions   Pending Prescriptions Disp Refills     losartan (COZAAR) 50 MG tablet [Pharmacy Med Name: LOSARTAN 50MG TABLETS] 90 tablet 0     Sig: TAKE 1 TABLET BY MOUTH EVERY DAY       Angiotensin Receptor Blocker Protocol Failed - 4/25/2019 10:03 AM        Failed - Serum potassium within the past 12 months     No results found for: LN-POTASSIUM          Failed - Serum creatinine within the past 12 months     Creatinine   Date Value Ref Range Status   02/13/2018 1.55 (H) 0.70 - 1.30 mg/dL Final             Passed - PCP or prescribing provider visit in past 12 months       Last office visit with prescriber/PCP: 10/4/2018 Ashish Morley MD OR same dept: 10/4/2018 Ashish Morley MD OR same specialty: 10/4/2018 Ashish Morley MD  Last physical: Visit date not found Last MTM visit: Visit date not found   Next visit within 3 mo: Visit date not found  Next physical within 3 mo: Visit date not found  Prescriber OR PCP: Ashish Morley MD  Last diagnosis associated with med order: There are no diagnoses linked to this encounter.  If protocol passes may refill for 12 months if within 3 months of last provider visit (or a total of 15 months).             Passed - Blood pressure filed in past 12 months     BP Readings from Last 1 Encounters:   10/04/18 118/60

## 2021-05-28 NOTE — PROGRESS NOTES
Assessment and Plan:     1. Chronic systolic heart failure (H)  Remains in compensation. Last EF 9/18 17%. Current medications of carvedilol, spironolactone, furosemide, losartan, and isosorbide - no changes recommended.      2. History of Atrial fibrillation  Pacemaker/Defibrillator in place with AV dual paced rhythm. Not on long term anticoagulation.     3. Encounter for Medicare annual wellness exam  Has had anemia in the past.  No symptoms of bleeding.  Will assess iron stores.    - Iron and Transferrin Iron Binding Capacity  - Ferritin  - Comprehensive Metabolic Panel    4. Chills  This is without fever, or feeling ill.  Suspect it is due to lower thermogenesis of aging, and his recent weight loss.   Will r/o anemia.    - HM1(CBC and Differential)    6. Weight loss  He has lost 20 pounds since 10/18.  Unclear etiology.  No clinical symptoms to suggest a malignancy, although this is possible.  Thyroid disease needs also to be considered.  Will follow.  Further evaluation if this progresses, or new suggestive symptoms develop.  - Thyroid Stimulating Hormone (TSH)  - T4, Free    The patient's current medical problems were reviewed.    I have had an Advance Directives discussion with the patient.  I recommended that he consider DNR and DNI as it is my professional opinion that in the event of cardiac arrest, attempts at resuscitation are very unlikely to have meaningful benefit.  He takes this into consideration and wants to think about it.     The following health maintenance schedule was reviewed with the patient and provided in printed form in the after visit summary:   Health Maintenance   Topic Date Due     ZOSTER VACCINES (1 of 2) 03/17/1981     FALL RISK ASSESSMENT  10/04/2019     ADVANCE DIRECTIVES DISCUSSED WITH PATIENT  08/30/2021     TD 18+ HE  01/06/2022     PNEUMOCOCCAL POLYSACCHARIDE VACCINE AGE 65 AND OVER  Completed     INFLUENZA VACCINE RULE BASED  Completed     PNEUMOCOCCAL CONJUGATE VACCINE FOR  ADULTS (PCV13 OR PREVNAR)  Completed        Subjective:   Chief Complaint: Isaac Dias is an 88 y.o. male here for an Annual Wellness visit.     HPI:  He is here for Annual wellness. He remains largely independent.  Overall feels well.  Does feel 'chills' easily - feels mildly cold.  He has had weight loss.  He reports eating normally, no fever, or chest or abdominal pain.  No changes in bowel or bladder symptoms.  Denies unusual dyspnea, or headache.  No symptoms of bleeding.  Sleeping OK.    Review of Systems: Please see above.  The rest of the review of systems are negative for all systems.    Patient Care Team:  Ashish Morley MD as PCP - General     Patient Active Problem List   Diagnosis     Pacemaker Placement     Hyperlipidemia     Hypertension     Colonic Angiodysplasia     Coronary Artery Disease     Rheumatoid Arthritis     Spinal Stenosis     Polymyalgia Rheumatica     Ventricular Tachycardia     Xerosis Cutis     Chronic systolic heart failure (H)     ACP (advance care planning)     Past Medical History:   Diagnosis Date     Acute myocardial infarction (H)     Created by Conversion  Replacement Utility updated for latest IMO load     Atrial fibrillation (H)     Resolved after ablation      Chronic systolic heart failure (H) 10/4/2018     Coronary artery disease       Past Surgical History:   Procedure Laterality Date     CARDIAC ELECTROPHYSIOLOGY MAPPING AND ABLATION       CARDIAC PACEMAKER PLACEMENT      ICD combined     CERVICAL DISCECTOMY        Family History   Problem Relation Age of Onset     Colon cancer Mother          age 77     Heart attack Father          age 77      Social History     Socioeconomic History     Marital status:      Spouse name: Not on file     Number of children: Not on file     Years of education: Not on file     Highest education level: Not on file   Occupational History     Not on file   Social Needs     Financial resource strain: Not on file     Food  insecurity:     Worry: Not on file     Inability: Not on file     Transportation needs:     Medical: Not on file     Non-medical: Not on file   Tobacco Use     Smoking status: Never Smoker     Smokeless tobacco: Never Used   Substance and Sexual Activity     Alcohol use: No     Drug use: No     Sexual activity: Not on file   Lifestyle     Physical activity:     Days per week: Not on file     Minutes per session: Not on file     Stress: Not on file   Relationships     Social connections:     Talks on phone: Not on file     Gets together: Not on file     Attends [employer] service: Not on file     Active member of club or organization: Not on file     Attends meetings of clubs or organizations: Not on file     Relationship status: Not on file     Intimate partner violence:     Fear of current or ex partner: Not on file     Emotionally abused: Not on file     Physically abused: Not on file     Forced sexual activity: Not on file   Other Topics Concern     Not on file   Social History Narrative    He is  and is a retired auto-.  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      Current Outpatient Medications   Medication Sig Dispense Refill     aspirin 81 MG EC tablet Take 81 mg by mouth daily.       carvedilol (COREG) 6.25 MG tablet TAKE 2 TABLETS(12.5 MG) BY MOUTH TWICE DAILY WITH MEALS 360 tablet 3     cholecalciferol, vitamin D3, (VITAMIN D3) 1,000 unit capsule Take 1 capsule (1,000 Units total) by mouth daily. 90 capsule 3     docusate sodium (COLACE) 100 MG capsule Take 1 capsule (100 mg total) by mouth 2 (two) times a day. 30 capsule 3     ferrous sulfate 325 (65 FE) MG tablet TAKE 1 TABLET BY MOUTH TWICE DAILY WITH MEALS 180 tablet 3     furosemide (LASIX) 40 MG tablet 80 mg in AM and 40 in  tablet 2     isosorbide mononitrate (IMDUR) 60 MG 24 hr tablet TAKE 1 TABLET(60 MG) BY MOUTH DAILY 90 tablet 3     loratadine (CLARITIN) 10 mg tablet Take 1 tablet (10 mg total) by mouth daily. 90 tablet  "3     losartan (COZAAR) 50 MG tablet TAKE 1 TABLET BY MOUTH EVERY DAY 90 tablet 0     nitroglycerin (NITROSTAT) 0.4 MG SL tablet Place 1 tablet (0.4 mg total) under the tongue every 5 (five) minutes as needed for chest pain. 30 tablet prn     peg 400-propylene glycol (SYSTANE) 0.4-0.3 % Drop Administer 1 drop to both eyes 4 (four) times a day as needed. 20 mL 3     potassium chloride (MICRO-K) 10 mEq CR capsule TAKE 1 CAPSULE(10 MEQ) BY MOUTH DAILY 90 capsule 3     simvastatin (ZOCOR) 20 MG tablet TAKE 1 TABLET(20 MG) BY MOUTH AT BEDTIME 90 tablet 3     spironolactone (ALDACTONE) 25 MG tablet TAKE 1 TABLET(25 MG) BY MOUTH DAILY 90 tablet 3     triamcinolone (KENALOG) 0.1 % cream Apply topically 2 (two) times a day.    **[facility] @ [phone]** 30 g 2     No current facility-administered medications for this visit.       Objective:   Vital Signs:   Visit Vitals  /68 (Patient Site: Left Arm, Patient Position: Sitting, Cuff Size: Adult Regular)   Pulse 71   Ht 5' 10\" (1.778 m)   Wt 162 lb 12.8 oz (73.8 kg)   SpO2 98%   BMI 23.36 kg/m       PHYSICAL EXAM  General:  Elderly, in NAD, well appearing  HEENT:  No congestion  Neck:  No adenopathy, or thyroid enlargement  Chest:  Clear without wheezes or rhonchi, no rales, no cough with DB  Heart:  S1 S2 without murmur  Abdomen:  Soft, flat, and non-tender, no mass or guarding  Extremities:  Minimal edema, knees are mildly dystrophic, reduced distal pulses  Neuro:  Speech is clear, no focal motor weakness, gait is normal for age  Psych:  Thinking is mildly slow and clear, mood is cheerful, behavior is normal    Assessment Results 5/1/2019   Activities of Daily Living No help needed   Instrumental Activities of Daily Living 2-4 - Moderate impairment   Get Up and Go Score Less than 12 seconds   Mini Cog Total Score 5   Some recent data might be hidden     A Mini-Cog score of 0-2 suggests the possibility of dementia, score of 3-5 suggests no dementia    Identified Health " Risks:     Patient's advanced directive was discussed and I am comfortable with the patient's wishes.

## 2021-05-29 ENCOUNTER — RECORDS - HEALTHEAST (OUTPATIENT)
Dept: ADMINISTRATIVE | Facility: CLINIC | Age: 86
End: 2021-05-29

## 2021-05-30 VITALS — WEIGHT: 175.6 LBS | HEIGHT: 70 IN | BODY MASS INDEX: 25.14 KG/M2

## 2021-05-30 NOTE — TELEPHONE ENCOUNTER
Refill Approved    Rx renewed per Medication Renewal Policy. Medication was last renewed on 4/29/2019  Last office visit: 5/1/2019 with Dr KENYA Waters, [facility] Triage/Med Refill 7/24/2019     Requested Prescriptions   Pending Prescriptions Disp Refills     losartan (COZAAR) 50 MG tablet [Pharmacy Med Name: LOSARTAN 50MG TABLETS] 90 tablet 0     Sig: TAKE 1 TABLET BY MOUTH EVERY DAY       Angiotensin Receptor Blocker Protocol Passed - 7/24/2019  5:04 AM        Passed - PCP or prescribing provider visit in past 12 months       Last office visit with prescriber/PCP: 10/4/2018 Ashish Morley MD OR same dept: 10/4/2018 Ashish Morley MD OR same specialty: 10/4/2018 Ashish Morley MD  Last physical: 5/1/2019 Last MTM visit: Visit date not found   Next visit within 3 mo: Visit date not found  Next physical within 3 mo: Visit date not found  Prescriber OR PCP: Ashish Morley MD  Last diagnosis associated with med order: 1. Benign essential hypertension  - losartan (COZAAR) 50 MG tablet [Pharmacy Med Name: LOSARTAN 50MG TABLETS]; TAKE 1 TABLET BY MOUTH EVERY DAY  Dispense: 90 tablet; Refill: 0    If protocol passes may refill for 12 months if within 3 months of last provider visit (or a total of 15 months).             Passed - Serum potassium within the past 12 months     Lab Results   Component Value Date    Potassium 4.5 05/01/2019             Passed - Blood pressure filed in past 12 months     BP Readings from Last 1 Encounters:   05/01/19 102/68             Passed - Serum creatinine within the past 12 months     Creatinine   Date Value Ref Range Status   05/01/2019 1.65 (H) 0.70 - 1.30 mg/dL Final

## 2021-05-31 VITALS — BODY MASS INDEX: 25.77 KG/M2 | WEIGHT: 179.6 LBS

## 2021-05-31 VITALS — WEIGHT: 174 LBS | BODY MASS INDEX: 24.97 KG/M2

## 2021-05-31 VITALS — WEIGHT: 176.4 LBS | BODY MASS INDEX: 25.31 KG/M2

## 2021-05-31 VITALS — WEIGHT: 179.4 LBS | BODY MASS INDEX: 25.74 KG/M2

## 2021-05-31 NOTE — TELEPHONE ENCOUNTER
Daughter (Hilaria) is the caller.  Pt has had a cold for the past 5 days, productive cough of green phlegm, fever and chills. The cough keeps him awake at night.  Pt can't drive, Mom is not able to drive. Daughter requesting On Call paged for Zpac.  On Call paged @ 10:21 am.  Dr. Worthy returned page @ 10:22 am.  Recommendation to go to ED for evaluation for pneumonia.  If daughter can't take pt to ED, then Zpac without evaluation and pt needs to be seen in clinic tomorrow.  This information called to Daughter and would want Rx called to Pharmacy and appt made for tomorrow 9/3/19 @ 10:20 am.  Lorraine Schwarz RN, MA  [facility] RN Triage Nurse Advisor

## 2021-05-31 NOTE — TELEPHONE ENCOUNTER
Refill Approved    Rx renewed per Medication Renewal Policy. Medication was last renewed on 9/2/19, Pt [facility] closed and daughter requesting Rx be sent to [facility] on [address], [place].    Lorraine Schwarz, [facility] Triage/Med Refill 9/2/2019     Requested Prescriptions   Pending Prescriptions Disp Refills     azithromycin (ZITHROMAX) 250 MG tablet 6 tablet 0     Sig: Take 500 mg (2 x 250 mg tablets) on day 1 followed by 250 mg (1 tablet) on days 2-5.       There is no refill protocol information for this order

## 2021-05-31 NOTE — PATIENT INSTRUCTIONS - HE
1. Robitussin with codeine as needed, every 8 hours.  The cough will tend to be worse in the evening and early night time.  The cough will persist as a dry, tickling cough after the congestion has cleared.     2. Follow up as previously arranged, and as needed.

## 2021-05-31 NOTE — PROGRESS NOTES
ASSESSMENT AND PLAN:    1. Lower respiratory infection  Exam and history are consistent with LRI and acute bronchitis. Will treat as follows.   - azithromycin (ZITHROMAX) 250 MG tablet; Take 500 mg (2 x 250 mg tablets) on day 1 followed by 250 mg (1 tablet) on days 2-5.  Dispense: 6 tablet; Refill: 0  - codeine-guaiFENesin (GUAIFENESIN AC)  mg/5 mL liquid; Take 5 mL by mouth 2 (two) times a day as needed for cough.  Dispense: 240 mL; Refill: 0    Patient Instructions   1. Robitussin with codeine as needed, every 8 hours.  The cough will tend to be worse in the evening and early night time.  The cough will persist as a dry, tickling cough after the congestion has cleared.     2. Follow up as previously arranged, and as needed.     CHIEF COMPLAINT:  Chief Complaint   Patient presents with     Cough     HISTORY OF PRESENT ILLNESS:  Isaac Dias is a 88 y.o. male with a persistent congested cough the past week.  It started as a URI and then the congested cough developed.  No high fever, or chills, or nausea or diarrhea.  No confusion or headache.  No unusual dyspnea.  Some malaise.     REVIEW OF SYSTEMS:   See HPI, all other systems on review are negative.    Past Medical History:   Diagnosis Date     Angiodysplasia of colon      Atrial fibrillation (H)     Resolved after ablation      Chronic systolic heart failure (H) 10/4/2018     Coronary artery disease      History of MI (myocardial infarction)      History of polymyalgia rheumatica      History of ventricular tachycardia      Hyperlipidemia      Hypertension      Rheumatoid arthritis (H)      Status cardiac pacemaker      Social History     Tobacco Use   Smoking Status Never Smoker   Smokeless Tobacco Never Used     Family History   Problem Relation Age of Onset     Colon cancer Mother          age 77     Heart attack Father          age 77     Past Surgical History:   Procedure Laterality Date     CARDIAC ELECTROPHYSIOLOGY MAPPING AND ABLATION        CARDIAC PACEMAKER PLACEMENT      ICD combined     CERVICAL DISCECTOMY       VITALS:  Vitals:    09/03/19 1012   BP: 108/60   Patient Site: Left Arm   Patient Position: Sitting   Cuff Size: Adult Regular   Pulse: 78   Temp: 97.2  F (36.2  C)   TempSrc: Tympanic   SpO2: 97%   Weight: 163 lb 11.2 oz (74.3 kg)     Wt Readings from Last 3 Encounters:   09/03/19 163 lb 11.2 oz (74.3 kg)   05/01/19 162 lb 12.8 oz (73.8 kg)   10/04/18 167 lb 9.6 oz (76 kg)     PHYSICAL EXAM:  Constitutional:  In NAD, alert and oriented  HEENT: mild rhinorrhea, mild erythema of the throat  Neck: no significant cervical or axillary adenopathy  Cardiac:  S1 S2 without murmur  Lungs: no wheezes, a few rhonchi are heard, a cough is induced with DB  Abdomen:   Soft, flat and non-tender    Ashish Morley MD  Internal Medicine  [facility]

## 2021-06-01 VITALS — WEIGHT: 172.9 LBS | BODY MASS INDEX: 24.81 KG/M2

## 2021-06-01 NOTE — TELEPHONE ENCOUNTER
Refill Approved    Rx renewed per Medication Renewal Policy. Medication was last renewed on 9/13/18.    Patty Christine, [facility] Triage/Med Refill 9/20/2019     Requested Prescriptions   Pending Prescriptions Disp Refills     furosemide (LASIX) 40 MG tablet [Pharmacy Med Name: FUROSEMIDE 40MG TABLETS] 270 tablet 0     Sig: TAKE 2 TABLETS EVERY MORNING AND TAKE 1 TABLET BY MOUTH EVERY EVENING       Diuretics/Combination Diuretics Refill Protocol  Passed - 9/20/2019  2:52 PM        Passed - Visit with PCP or prescribing provider visit in past 12 months     Last office visit with prescriber/PCP: 2/13/2018 Myriam Pascual MD OR same dept: 9/3/2019 Ashish Morley MD OR same specialty: 9/3/2019 Ashish Morley MD  Last physical: 9/21/2016 Last MTM visit: Visit date not found   Next visit within 3 mo: Visit date not found  Next physical within 3 mo: Visit date not found  Prescriber OR PCP: Myriam Pascual MD  Last diagnosis associated with med order: 1. Congestive heart failure (H)  - furosemide (LASIX) 40 MG tablet [Pharmacy Med Name: FUROSEMIDE 40MG TABLETS]; TAKE 2 TABLETS EVERY MORNING AND TAKE 1 TABLET BY MOUTH EVERY EVENING  Dispense: 270 tablet; Refill: 0    If protocol passes may refill for 12 months if within 3 months of last provider visit (or a total of 15 months).             Passed - Serum Potassium in past 12 months      Lab Results   Component Value Date    Potassium 4.5 05/01/2019             Passed - Serum Sodium in past 12 months      Lab Results   Component Value Date    Sodium 139 05/01/2019             Passed - Blood pressure on file in past 12 months     BP Readings from Last 1 Encounters:   09/03/19 108/60             Passed - Serum Creatinine in past 12 months      Creatinine   Date Value Ref Range Status   05/01/2019 1.65 (H) 0.70 - 1.30 mg/dL Final

## 2021-06-02 VITALS — WEIGHT: 167.6 LBS | BODY MASS INDEX: 24.05 KG/M2

## 2021-06-02 NOTE — TELEPHONE ENCOUNTER
Refill Approved    Rx renewed per Medication Renewal Policy. Medication was last renewed on 11/20/18.    Diane James, [facility] Triage/Med Refill 10/22/2019     Requested Prescriptions   Pending Prescriptions Disp Refills     carvedilol (COREG) 6.25 MG tablet [Pharmacy Med Name: CARVEDILOL 6.25MG TABLETS] 360 tablet 0     Sig: TAKE 2 TABLETS(12.5 MG) BY MOUTH TWICE DAILY WITH MEALS       Beta-Blockers Refill Protocol Passed - 10/22/2019  5:03 AM        Passed - PCP or prescribing provider visit in past 12 months or next 3 months     Last office visit with prescriber/PCP: 3/23/2017 Chaparro Jackson MD OR same dept: 9/3/2019 Ashish Morley MD OR same specialty: 9/3/2019 Ashish Morley MD  Last physical: Visit date not found Last MTM visit: Visit date not found   Next visit within 3 mo: Visit date not found  Next physical within 3 mo: Visit date not found  Prescriber OR PCP: Chaparro Jackson MD  Last diagnosis associated with med order: There are no diagnoses linked to this encounter.  If protocol passes may refill for 12 months if within 3 months of last provider visit (or a total of 15 months).             Passed - Blood pressure filed in past 12 months     BP Readings from Last 1 Encounters:   09/03/19 108/60

## 2021-06-03 VITALS
DIASTOLIC BLOOD PRESSURE: 60 MMHG | TEMPERATURE: 97.2 F | SYSTOLIC BLOOD PRESSURE: 108 MMHG | BODY MASS INDEX: 23.49 KG/M2 | OXYGEN SATURATION: 97 % | HEART RATE: 78 BPM | WEIGHT: 163.7 LBS

## 2021-06-03 VITALS — BODY MASS INDEX: 23.31 KG/M2 | WEIGHT: 162.8 LBS | HEIGHT: 70 IN

## 2021-06-04 VITALS
BODY MASS INDEX: 22.76 KG/M2 | OXYGEN SATURATION: 98 % | HEART RATE: 74 BPM | HEIGHT: 70 IN | WEIGHT: 159 LBS | DIASTOLIC BLOOD PRESSURE: 60 MMHG | RESPIRATION RATE: 16 BRPM | SYSTOLIC BLOOD PRESSURE: 106 MMHG

## 2021-06-05 VITALS
HEIGHT: 72 IN | RESPIRATION RATE: 20 BRPM | SYSTOLIC BLOOD PRESSURE: 84 MMHG | HEART RATE: 84 BPM | OXYGEN SATURATION: 98 % | BODY MASS INDEX: 20.52 KG/M2 | DIASTOLIC BLOOD PRESSURE: 56 MMHG | WEIGHT: 151.5 LBS

## 2021-06-05 VITALS
HEART RATE: 72 BPM | WEIGHT: 153.5 LBS | DIASTOLIC BLOOD PRESSURE: 60 MMHG | OXYGEN SATURATION: 99 % | BODY MASS INDEX: 22.02 KG/M2 | SYSTOLIC BLOOD PRESSURE: 108 MMHG

## 2021-06-05 VITALS
HEART RATE: 67 BPM | RESPIRATION RATE: 24 BRPM | DIASTOLIC BLOOD PRESSURE: 67 MMHG | WEIGHT: 148.31 LBS | BODY MASS INDEX: 20.11 KG/M2 | TEMPERATURE: 97.6 F | OXYGEN SATURATION: 99 % | SYSTOLIC BLOOD PRESSURE: 105 MMHG

## 2021-06-05 NOTE — TELEPHONE ENCOUNTER
Refill Approved    Rx renewed per Medication Renewal Policy. Medication was last renewed on 2/11/19.    Patty Christine, [facility] Triage/Med Refill 1/21/2020     Requested Prescriptions   Pending Prescriptions Disp Refills     potassium chloride (MICRO-K) 10 mEq CR capsule [Pharmacy Med Name: POTASSIUM CL 10MEQ ER CAPSULES] 90 capsule 3     Sig: TAKE 1 CAPSULE BY MOUTH EVERY DAY       Potassium Supplements Refill Protocol Passed - 1/20/2020  7:11 PM        Passed - PCP or prescribing provider visit in past 12 months       Last office visit with prescriber/PCP: 9/3/2019 Ashish Morley MD OR same dept: 9/3/2019 Ashish Morley MD OR same specialty: 9/3/2019 Ashish Morley MD  Last physical: 5/1/2019 Last MTM visit: Visit date not found   Next visit within 3 mo: Visit date not found  Next physical within 3 mo: Visit date not found  Prescriber OR PCP: Ashish Morley MD  Last diagnosis associated with med order: 1. Hypokalemia  - potassium chloride (MICRO-K) 10 mEq CR capsule [Pharmacy Med Name: POTASSIUM CL 10MEQ ER CAPSULES]; TAKE 1 CAPSULE BY MOUTH EVERY DAY  Dispense: 90 capsule; Refill: 3    2. HTN (hypertension)  - spironolactone (ALDACTONE) 25 MG tablet [Pharmacy Med Name: SPIRONOLACTONE 25MG TABLETS]; TAKE 1 TABLET BY MOUTH DAILY  Dispense: 90 tablet; Refill: 3  - isosorbide mononitrate (IMDUR) 60 MG 24 hr tablet [Pharmacy Med Name: ISOSORBIDE MONONITRATE 60MG ER TABS]; TAKE 1 TABLET BY MOUTH EVERY DAY  Dispense: 90 tablet; Refill: 3    3. Hyperlipidemia  - simvastatin (ZOCOR) 20 MG tablet [Pharmacy Med Name: SIMVASTATIN 20MG TABLETS]; TAKE 1 TABLET BY MOUTH EVERY NIGHT AT BEDTIME  Dispense: 90 tablet; Refill: 3    If protocol passes may refill for 12 months if within 3 months of last provider visit (or a total of 15 months).             Passed - Potassium level in last 12 months     Lab Results   Component Value Date    Potassium 4.5 05/01/2019             spironolactone (ALDACTONE) 25 MG tablet  [Pharmacy Med Name: SPIRONOLACTONE 25MG TABLETS] 90 tablet 3     Sig: TAKE 1 TABLET BY MOUTH DAILY       Diuretics/Combination Diuretics Refill Protocol  Passed - 1/20/2020  7:11 PM        Passed - Visit with PCP or prescribing provider visit in past 12 months     Last office visit with prescriber/PCP: 9/3/2019 Ashish Morley MD OR same dept: 9/3/2019 Ashish Morley MD OR same specialty: 9/3/2019 Ashish Morley MD  Last physical: 5/1/2019 Last MTM visit: Visit date not found   Next visit within 3 mo: Visit date not found  Next physical within 3 mo: Visit date not found  Prescriber OR PCP: Ashish Morley MD  Last diagnosis associated with med order: 1. Hypokalemia  - potassium chloride (MICRO-K) 10 mEq CR capsule [Pharmacy Med Name: POTASSIUM CL 10MEQ ER CAPSULES]; TAKE 1 CAPSULE BY MOUTH EVERY DAY  Dispense: 90 capsule; Refill: 3    2. HTN (hypertension)  - spironolactone (ALDACTONE) 25 MG tablet [Pharmacy Med Name: SPIRONOLACTONE 25MG TABLETS]; TAKE 1 TABLET BY MOUTH DAILY  Dispense: 90 tablet; Refill: 3  - isosorbide mononitrate (IMDUR) 60 MG 24 hr tablet [Pharmacy Med Name: ISOSORBIDE MONONITRATE 60MG ER TABS]; TAKE 1 TABLET BY MOUTH EVERY DAY  Dispense: 90 tablet; Refill: 3    3. Hyperlipidemia  - simvastatin (ZOCOR) 20 MG tablet [Pharmacy Med Name: SIMVASTATIN 20MG TABLETS]; TAKE 1 TABLET BY MOUTH EVERY NIGHT AT BEDTIME  Dispense: 90 tablet; Refill: 3    If protocol passes may refill for 12 months if within 3 months of last provider visit (or a total of 15 months).             Passed - Serum Potassium in past 12 months      Lab Results   Component Value Date    Potassium 4.5 05/01/2019             Passed - Serum Sodium in past 12 months      Lab Results   Component Value Date    Sodium 139 05/01/2019             Passed - Blood pressure on file in past 12 months     BP Readings from Last 1 Encounters:   09/03/19 108/60             Passed - Serum Creatinine in past 12 months      Creatinine   Date Value  Ref Range Status   05/01/2019 1.65 (H) 0.70 - 1.30 mg/dL Final             isosorbide mononitrate (IMDUR) 60 MG 24 hr tablet [Pharmacy Med Name: ISOSORBIDE MONONITRATE 60MG ER TABS] 90 tablet 3     Sig: TAKE 1 TABLET BY MOUTH EVERY DAY       Isosorbide Refill Protocol Passed - 1/20/2020  7:11 PM        Passed - Visit with PCP or prescribing provider visit in last 6 months or next 3 months     Last office visit with prescriber/PCP: 9/3/2019 OR same dept: 9/3/2019 Ashish Morley MD OR same specialty: 9/3/2019 Ashish Morley MD Last physical: Visit date not found Last MTM visit: Visit date not found     Next appt within 3 mo: Visit date not found  Next physical within 3 mo: Visit date not found  Prescriber OR PCP: Ashish Morley MD  Last diagnosis associated with med order: 1. Hypokalemia  - potassium chloride (MICRO-K) 10 mEq CR capsule [Pharmacy Med Name: POTASSIUM CL 10MEQ ER CAPSULES]; TAKE 1 CAPSULE BY MOUTH EVERY DAY  Dispense: 90 capsule; Refill: 3    2. HTN (hypertension)  - spironolactone (ALDACTONE) 25 MG tablet [Pharmacy Med Name: SPIRONOLACTONE 25MG TABLETS]; TAKE 1 TABLET BY MOUTH DAILY  Dispense: 90 tablet; Refill: 3  - isosorbide mononitrate (IMDUR) 60 MG 24 hr tablet [Pharmacy Med Name: ISOSORBIDE MONONITRATE 60MG ER TABS]; TAKE 1 TABLET BY MOUTH EVERY DAY  Dispense: 90 tablet; Refill: 3    3. Hyperlipidemia  - simvastatin (ZOCOR) 20 MG tablet [Pharmacy Med Name: SIMVASTATIN 20MG TABLETS]; TAKE 1 TABLET BY MOUTH EVERY NIGHT AT BEDTIME  Dispense: 90 tablet; Refill: 3    If protocol passes may refill for 6 months if within 3 months of last provider visit (or a total of 9 months).              Passed - Blood pressure filed in past 12 months     BP Readings from Last 1 Encounters:   09/03/19 108/60             simvastatin (ZOCOR) 20 MG tablet [Pharmacy Med Name: SIMVASTATIN 20MG TABLETS] 90 tablet 3     Sig: TAKE 1 TABLET BY MOUTH EVERY NIGHT AT BEDTIME       Statins Refill Protocol (Hmg CoA Reductase  Inhibitors) Passed - 1/20/2020  7:11 PM        Passed - PCP or prescribing provider visit in past 12 months      Last office visit with prescriber/PCP: 9/3/2019 Ashish Morley MD OR same dept: 9/3/2019 Ashish Morley MD OR same specialty: 9/3/2019 Ashish Morley MD  Last physical: 5/1/2019 Last MTM visit: Visit date not found   Next visit within 3 mo: Visit date not found  Next physical within 3 mo: Visit date not found  Prescriber OR PCP: Ashish Morley MD  Last diagnosis associated with med order: 1. Hypokalemia  - potassium chloride (MICRO-K) 10 mEq CR capsule [Pharmacy Med Name: POTASSIUM CL 10MEQ ER CAPSULES]; TAKE 1 CAPSULE BY MOUTH EVERY DAY  Dispense: 90 capsule; Refill: 3    2. HTN (hypertension)  - spironolactone (ALDACTONE) 25 MG tablet [Pharmacy Med Name: SPIRONOLACTONE 25MG TABLETS]; TAKE 1 TABLET BY MOUTH DAILY  Dispense: 90 tablet; Refill: 3  - isosorbide mononitrate (IMDUR) 60 MG 24 hr tablet [Pharmacy Med Name: ISOSORBIDE MONONITRATE 60MG ER TABS]; TAKE 1 TABLET BY MOUTH EVERY DAY  Dispense: 90 tablet; Refill: 3    3. Hyperlipidemia  - simvastatin (ZOCOR) 20 MG tablet [Pharmacy Med Name: SIMVASTATIN 20MG TABLETS]; TAKE 1 TABLET BY MOUTH EVERY NIGHT AT BEDTIME  Dispense: 90 tablet; Refill: 3    If protocol passes may refill for 12 months if within 3 months of last provider visit (or a total of 15 months).

## 2021-06-09 NOTE — TELEPHONE ENCOUNTER
Who is calling:    Hilaria Yoder    Reason for Call:    Daughter calling to get an update from today's visit 07/09/2020 for her father.  CMA gave details from PCP notes with the plan of care and caller verbalized an understanding.  Requesting PCP to place referral for Derm just in case the treatment plan doesn't work.     Date of last appointment with primary care: 07/09/2020    Okay to leave a detailed message: No callback needed.

## 2021-06-09 NOTE — TELEPHONE ENCOUNTER
RN cannot approve Refill Request    RN can NOT refill this medication Protocol failed and NO refill given.       Patty Christine, [facility] Triage/Med Refill 6/25/2020    Requested Prescriptions   Pending Prescriptions Disp Refills     furosemide (LASIX) 40 MG tablet [Pharmacy Med Name: FUROSEMIDE 40MG TABLETS] 270 tablet 2     Sig: TAKE 2 TABLETS BY MOUTH EVERY MORNING AND 1 TABLET EVERY EVENING       Diuretics/Combination Diuretics Refill Protocol  Failed - 6/25/2020  6:09 AM        Failed - Serum Potassium in past 12 months      No results found for: LN-POTASSIUM          Failed - Serum Sodium in past 12 months      No results found for: LN-SODIUM          Failed - Serum Creatinine in past 12 months      Creatinine   Date Value Ref Range Status   05/01/2019 1.65 (H) 0.70 - 1.30 mg/dL Final             Passed - Visit with PCP or prescribing provider visit in past 12 months     Last office visit with prescriber/PCP: 9/3/2019 Ashish Morley MD OR same dept: 9/3/2019 Ashish Morley MD OR same specialty: 9/3/2019 Ashish Morley MD  Last physical: 5/1/2019 Last MTM visit: Visit date not found   Next visit within 3 mo: Visit date not found  Next physical within 3 mo: Visit date not found  Prescriber OR PCP: Ashish Morley MD  Last diagnosis associated with med order: 1. Congestive heart failure (H)  - furosemide (LASIX) 40 MG tablet [Pharmacy Med Name: FUROSEMIDE 40MG TABLETS]; TAKE 2 TABLETS BY MOUTH EVERY MORNING AND 1 TABLET EVERY EVENING  Dispense: 270 tablet; Refill: 2    If protocol passes may refill for 12 months if within 3 months of last provider visit (or a total of 15 months).             Passed - Blood pressure on file in past 12 months     BP Readings from Last 1 Encounters:   09/03/19 108/60

## 2021-06-09 NOTE — PROGRESS NOTES
"ASSESSMENT:  1.  Bronchitis:  Viral versus bacterial.  His wife is ill with similar symptoms.  He does not have fevers, wheezing, or hypoxia.  The pros and cons of trying an antibiotic were discussed.  He is interested in this.    PLAN:  1.  Zithromax Z-Sukhjinder  2.  Cheratussin AC for cough  3.  Call if not improving in 1 week.      Medications Discontinued During This Encounter   Medication Reason     mirtazapine (REMERON) 15 MG tablet Duplicate order           ASSESSED PROBLEMS:  Bronchitis    CHIEF COMPLAINT:  Chief Complaint   Patient presents with     URI     runny nose, cough and chills for the past few weeks       HISTORY OF PRESENT ILLNESS:  Isaac is a 86 y.o. male presenting to the clinic today accompanied by his wife and son with complaints of an upper respiratory infection. He reports that about 2 weeks ago he developed an upper respiratory infection at around the same time as his wife. His symptoms began with a sore throat and a day or two later he developed cough that became productive after a day or two. The cough continues and he has experienced chest congestion along with postnasal drainage. He mentions occasional chills and that the cough has been waking him at night, reducing his sleep. He also mentions rhinorrhea throughout the day and waking up with a dry throat. He denies any chest pain. He has been using cough drops with moderate alleviation of throat pain.     REVIEW OF SYSTEMS:   He denies any fevers or chest pain.   All other systems are negative.    PFSH:  His son has also been sick recently. He had his birthday on [employer][birth year] day.     TOBACCO USE:  History   Smoking Status     Never Smoker   Smokeless Tobacco     Not on file       VITALS:  Vitals:    03/23/17 1310   BP: 112/72   Patient Site: Left Arm   Patient Position: Sitting   Cuff Size: Adult Regular   Pulse: 60   Temp: 97  F (36.1  C)   TempSrc: Tympanic   Weight: 175 lb 9.6 oz (79.7 kg)   Height: 5' 10\" (1.778 m)     Wt Readings from " Last 3 Encounters:   03/23/17 175 lb 9.6 oz (79.7 kg)   09/21/16 170 lb (77.1 kg)   08/30/16 173 lb 9.6 oz (78.7 kg)       PHYSICAL EXAM:  Constitutional:   Reveals an alert, pleasant talkative elderly male. Does not appear acutely ill. Affect appropriate  Vitals: per nursing notes.  HEENT: Nose congested. Sinuses nontender to tapping.    Ears: Minor cerumen, external canals, TMs clear.    Eyes:  EOMs full, PERRL.  Oropharynx:   Mouth and throat clear, no thrush or exudate.  Neck:  Supple, no carotid bruits or adenopathy.  Back:  No spine or CVA pain.  Thorax:   midline sternotomy scar   Lungs: Clear to A&P without rales or wheezes.  Respiratory effort normal.  Cardiac:   Irregularly irregular rate and rhythm, II/VI systolic murmur left sternal border.  Abdomen:  Soft, active bowel sounds without bruits, mass, or tenderness.  Extremities:   Trace peripheral edema, right greater than left, pulses in the feet intact.    Skin:  No jaundice, peripheral cyanosis or lesions to suggest malignancy.  Neuro:  Alert and oriented. Cranial nerves, motor, sensory exams are intact.  No gross focal deficits.  Psychiatric:  Memory intact, mood appropriate.    ADDITIONAL HISTORY SUMMARIZED (2): Nurse triage notes reviewed from 3/17/2017 regarding URI.  DECISION TO OBTAIN EXTRA INFORMATION (1): Care everywhere accessed.   RADIOLOGY TESTS (1): None.  LABS (1): None.  MEDICINE TESTS (1): None.  INDEPENDENT REVIEW (2 each): None.     The visit lasted a total of 23 minutes face to face with the patient. Over 50% of the time was spent counseling and educating the patient about bronchitis.    ISorin, am scribing for and in the presence of, Dr. Jackson.    I, Dr. Jackson, personally performed the services described in this documentation, as scribed by Sorin Segura in my presence, and it is both accurate and complete.    Dragon dictation was used for this note.  Speech recognition errors are a  possibility.    MEDICATIONS:  Current Outpatient Prescriptions   Medication Sig Dispense Refill     aspirin 81 MG EC tablet Take 81 mg by mouth daily.       carvedilol (COREG) 6.25 MG tablet Take 6.25 mg by mouth 2 (two) times a day with meals.       cholecalciferol, vitamin D3, (VITAMIN D3) 1,000 unit capsule Take 1,000 Units by mouth daily.       docusate sodium (COLACE) 100 MG capsule Take 100 mg by mouth 2 (two) times a day.       ferrous sulfate 325 (65 FE) MG tablet TAKE 1 TABLET BY MOUTH TWICE DAILY WITH MEALS 180 tablet 3     furosemide (LASIX) 40 MG tablet TAKE ONE TABLET BY MOUTH TWICE DAILY OR AS DIRECTED 180 tablet 3     isosorbide mononitrate (IMDUR) 60 MG 24 hr tablet Take 60 mg by mouth daily.       loratadine (CLARITIN) 10 mg tablet Take 10 mg by mouth daily.       losartan (COZAAR) 50 MG tablet Take 50 mg by mouth daily.       mirtazapine (REMERON SOL-TAB) 15 MG disintegrating tablet Take 1 tablet (15 mg total) by mouth bedtime. 90 tablet 3     nitroglycerin (NITROSTAT) 0.4 MG SL tablet Place 1 tablet (0.4 mg total) under the tongue every 5 (five) minutes as needed for chest pain. 30 tablet prn     peg 400-propylene glycol (SYSTANE) 0.4-0.3 % Drop Administer 1 drop to both eyes 4 (four) times a day as needed.       potassium chloride (KLOR-CON) 10 MEQ CR tablet TAKE 2 TABLETS BY MOUTH DAILY 180 tablet 2     simvastatin (ZOCOR) 20 MG tablet Take 20 mg by mouth bedtime.       spironolactone (ALDACTONE) 25 MG tablet Take 25 mg by mouth daily.       triamcinolone (KENALOG) 0.1 % cream Apply topically 2 (two) times a day.       No current facility-administered medications for this visit.        Total data points: 3.

## 2021-06-09 NOTE — PROGRESS NOTES
"Isaac Dias is a 89 y.o. male who is being evaluated via a billable telephone visit.      The patient has been notified of following:     \"This telephone visit will be conducted via a call between you and your physician/provider. We have found that certain health care needs can be provided without the need for a physical exam.  This service lets us provide the care you need with a short phone conversation.  If a prescription is necessary we can send it directly to your pharmacy.  If lab work is needed we can place an order for that and you can then stop by our lab to have the test done at a later time.    Telephone visits are billed at different rates depending on your insurance coverage. During this emergency period, for some insurers they may be billed the same as an in-person visit.  Please reach out to your insurance provider with any questions.    If during the course of the call the physician/provider feels a telephone visit is not appropriate, you will not be charged for this service.\"    Patient has given verbal consent to a Telephone visit? Yes    What phone number would you like to be contacted at? [phone]    Patient would like to receive their AVS by AVS Preference: Mail a copy.    Additional provider notes: Itchiness and rash and recommend Lubriderm and refill  Kenalog cr 0.1% and also Hydroxyzine 25mg po four times a day prn and recommend formal Derm consult [facility]/ [facility] and cm and s/p PM and defibrillator  Implantation --stable w/o cp or shortness of breath or syncope   Wife on the line to assist and decrease hearing and this limited the telephone visit done today   PCP Dr Ashish Abdullahi whom they should contact one month re f/u and status     Assessment/Plan:  Lengthy consultation regarding itchy dermatitis that is quite diffuse.  Wonder about a drug eruption and or dryness and/or eczematous dermatitis.  Suggest the following Lubriderm plus " refill Kenalog cream 0.1% alternating lesions twice a day for both of these creams as well as hydroxyzine 25 mg 4 times a day as needed for itchiness to suppress the tendency to scratch also I did recommend formal dermatology consultation with Dr. Andriy Aguilar with adolfo Stoddard at [facility] dermatology.    Coronary artery disease and history of ischemic cardiomyopathy status post permanent pacemaker defibrillator in place.    Possible drug eruption but did not make any changes to this routine cardiac medications but did suggest a formal reconsultation with his PCP Dr. Ashish lopez within the next month.      Phone call duration:  21 minutes    Barbara Thornton [facility]

## 2021-06-10 NOTE — TELEPHONE ENCOUNTER
RN cannot approve Refill Request    RN can NOT refill this medication Protocol failed and NO refill given. Last office visit: 9/3/2019 Ashish Morley MD Last Physical: 5/1/2019 Last MTM visit: Visit date not found Last visit same specialty: 9/3/2019 Ashish Morley MD.  Next visit within 3 mo: Visit date not found  Next physical within 3 mo: Visit date not found      Patty Christine, [facility] Triage/Med Refill 8/17/2020    Requested Prescriptions   Pending Prescriptions Disp Refills     potassium chloride (MICRO-K) 10 mEq CR capsule [Pharmacy Med Name: POTASSIUM CL 10MEQ ER CAPSULES] 90 capsule 1     Sig: TAKE 1 CAPSULE BY MOUTH EVERY DAY       Potassium Supplements Refill Protocol Failed - 8/16/2020 11:23 AM        Failed - Potassium level in last 12 months     No results found for: LN-POTASSIUM          Passed - PCP or prescribing provider visit in past 12 months       Last office visit with prescriber/PCP: 9/3/2019 Ashish Morley MD OR same dept: 9/3/2019 Ashish Morley MD OR same specialty: 9/3/2019 Ashish Morley MD  Last physical: 5/1/2019 Last MTM visit: Visit date not found   Next visit within 3 mo: Visit date not found  Next physical within 3 mo: Visit date not found  Prescriber OR PCP: Ashish Morley MD  Last diagnosis associated with med order: 1. Hypokalemia  - potassium chloride (MICRO-K) 10 mEq CR capsule [Pharmacy Med Name: POTASSIUM CL 10MEQ ER CAPSULES]; TAKE 1 CAPSULE BY MOUTH EVERY DAY  Dispense: 90 capsule; Refill: 1    If protocol passes may refill for 12 months if within 3 months of last provider visit (or a total of 15 months).

## 2021-06-10 NOTE — TELEPHONE ENCOUNTER
RN cannot approve Refill Request    RN can NOT refill this medication Protocol failed and NO refill given. Last office visit: 9/3/2019 Ashish Morley MD Last Physical: 5/1/2019 Last MTM visit: Visit date not found Last visit same specialty: 9/3/2019 Ashish Molrey MD.  Next visit within 3 mo: Visit date not found  Next physical within 3 mo: Visit date not found      Patty Christine, [facility] Triage/Med Refill 8/14/2020    Requested Prescriptions   Pending Prescriptions Disp Refills     simvastatin (ZOCOR) 20 MG tablet [Pharmacy Med Name: SIMVASTATIN 20MG TABLETS] 90 tablet 1     Sig: TAKE 1 TABLET BY MOUTH EVERY NIGHT AT BEDTIME       Statins Refill Protocol (Hmg CoA Reductase Inhibitors) Passed - 8/14/2020 11:37 AM        Passed - PCP or prescribing provider visit in past 12 months      Last office visit with prescriber/PCP: 9/3/2019 Ashish Morley MD OR same dept: 9/3/2019 Ashish Morley MD OR same specialty: 9/3/2019 Ashish Morley MD  Last physical: 5/1/2019 Last MTM visit: Visit date not found   Next visit within 3 mo: Visit date not found  Next physical within 3 mo: Visit date not found  Prescriber OR PCP: Ashish Morley MD  Last diagnosis associated with med order: 1. Hyperlipidemia  - simvastatin (ZOCOR) 20 MG tablet [Pharmacy Med Name: SIMVASTATIN 20MG TABLETS]; TAKE 1 TABLET BY MOUTH EVERY NIGHT AT BEDTIME  Dispense: 90 tablet; Refill: 1    2. HTN (hypertension)  - isosorbide mononitrate (IMDUR) 60 MG 24 hr tablet [Pharmacy Med Name: ISOSORBIDE MONONITRATE 60MG ER TABS]; TAKE 1 TABLET BY MOUTH EVERY DAY  Dispense: 90 tablet; Refill: 1  - spironolactone (ALDACTONE) 25 MG tablet [Pharmacy Med Name: SPIRONOLACTONE 25MG TABLETS]; TAKE 1 TABLET BY MOUTH DAILY  Dispense: 90 tablet; Refill: 1    If protocol passes may refill for 12 months if within 3 months of last provider visit (or a total of 15 months).                isosorbide mononitrate (IMDUR) 60 MG 24 hr tablet [Pharmacy Med Name:  ISOSORBIDE MONONITRATE 60MG ER TABS] 90 tablet 1     Sig: TAKE 1 TABLET BY MOUTH EVERY DAY       Isosorbide Refill Protocol Failed - 8/14/2020 11:37 AM        Failed - Visit with PCP or prescribing provider visit in last 6 months or next 3 months     Last office visit with prescriber/PCP: Visit date not found OR same dept: 9/3/2019 Ashish Morley MD OR same specialty: 9/3/2019 Ashish Morley MD Last physical: Visit date not found Last MTM visit: Visit date not found     Next appt within 3 mo: Visit date not found  Next physical within 3 mo: Visit date not found  Prescriber OR PCP: Ashish Morley MD  Last diagnosis associated with med order: 1. Hyperlipidemia  - simvastatin (ZOCOR) 20 MG tablet [Pharmacy Med Name: SIMVASTATIN 20MG TABLETS]; TAKE 1 TABLET BY MOUTH EVERY NIGHT AT BEDTIME  Dispense: 90 tablet; Refill: 1    2. HTN (hypertension)  - isosorbide mononitrate (IMDUR) 60 MG 24 hr tablet [Pharmacy Med Name: ISOSORBIDE MONONITRATE 60MG ER TABS]; TAKE 1 TABLET BY MOUTH EVERY DAY  Dispense: 90 tablet; Refill: 1  - spironolactone (ALDACTONE) 25 MG tablet [Pharmacy Med Name: SPIRONOLACTONE 25MG TABLETS]; TAKE 1 TABLET BY MOUTH DAILY  Dispense: 90 tablet; Refill: 1    If protocol passes may refill for 6 months if within 3 months of last provider visit (or a total of 9 months).              Passed - Blood pressure filed in past 12 months     BP Readings from Last 1 Encounters:   09/03/19 108/60                spironolactone (ALDACTONE) 25 MG tablet [Pharmacy Med Name: SPIRONOLACTONE 25MG TABLETS] 90 tablet 1     Sig: TAKE 1 TABLET BY MOUTH DAILY       Diuretics/Combination Diuretics Refill Protocol  Failed - 8/14/2020 11:37 AM        Failed - Serum Potassium in past 12 months      No results found for: LN-POTASSIUM          Failed - Serum Sodium in past 12 months      No results found for: LN-SODIUM          Failed - Serum Creatinine in past 12 months      Creatinine   Date Value Ref Range Status   05/01/2019  1.65 (H) 0.70 - 1.30 mg/dL Final             Passed - Visit with PCP or prescribing provider visit in past 12 months     Last office visit with prescriber/PCP: 9/3/2019 Ashish Morley MD OR same dept: 9/3/2019 Ashish Morley MD OR same specialty: 9/3/2019 Ashish Morley MD  Last physical: 5/1/2019 Last MTM visit: Visit date not found   Next visit within 3 mo: Visit date not found  Next physical within 3 mo: Visit date not found  Prescriber OR PCP: Ashish Morley MD  Last diagnosis associated with med order: 1. Hyperlipidemia  - simvastatin (ZOCOR) 20 MG tablet [Pharmacy Med Name: SIMVASTATIN 20MG TABLETS]; TAKE 1 TABLET BY MOUTH EVERY NIGHT AT BEDTIME  Dispense: 90 tablet; Refill: 1    2. HTN (hypertension)  - isosorbide mononitrate (IMDUR) 60 MG 24 hr tablet [Pharmacy Med Name: ISOSORBIDE MONONITRATE 60MG ER TABS]; TAKE 1 TABLET BY MOUTH EVERY DAY  Dispense: 90 tablet; Refill: 1  - spironolactone (ALDACTONE) 25 MG tablet [Pharmacy Med Name: SPIRONOLACTONE 25MG TABLETS]; TAKE 1 TABLET BY MOUTH DAILY  Dispense: 90 tablet; Refill: 1    If protocol passes may refill for 12 months if within 3 months of last provider visit (or a total of 15 months).             Passed - Blood pressure on file in past 12 months     BP Readings from Last 1 Encounters:   09/03/19 108/60

## 2021-06-11 NOTE — TELEPHONE ENCOUNTER
Called patient and left detailed message to call back to help reschedule appointment with Dr. Morley at the [facility]. Per Dr. Tate needs to be evaluated by prescribing provider.

## 2021-06-11 NOTE — PATIENT INSTRUCTIONS - HE
1. stop the hydralazine    2. Stop the potassium    3. Continue to stop the losartan    4. Continue the furosemide and the lower dose coreg-carvidolol.    5. Follow up in 2 months.

## 2021-06-11 NOTE — PROGRESS NOTES
ASSESSMENT AND PLAN:    1. Coronary Artery Disease  Asymptomatic.   - Lipid [place] FASTING    2. Chronic systolic heart failure   Clinically seems compensated. I have advised that he not take the hydralazine, given adverse side effect of malaise, fatigue, and continue to stay off losartan.  Continue the lower dose of carvedilol.    - Comprehensive Metabolic Panel  - carvediloL (COREG) 6.25 MG tablet; Take 1 tablet (6.25 mg total) by mouth 2 (two) times a day with meals.  - furosemide (LASIX) 40 MG tablet; Take 1 tablet (40 mg total) by mouth daily. TAKE 1 TABLET BY MOUTH EVERY MORNING AND 1 additional TABLET   for weight increase of >3lb in day or 5lb at night    3. Anemia   Likely related to chronic morbidities and age.  Will monitor and follow iron studies.  Further evaluation if this progresses.   - HM2(CBC w/o Differential)    5. Hypokalemia  -on spironolactone with carvedilol, and so not likely to require oral potassium supplement.       Patient Instructions   1. stop the hydralazine    2. Stop the potassium    3. Continue to stop the losartan    4. Continue the furosemide and the lower dose coreg-carvidolol.    5. Follow up in 2 months.     CHIEF COMPLAINT:  Chief Complaint   Patient presents with     Medication Management     ? side effects from Hydralazine     HISTORY OF PRESENT ILLNESS:  Isaac Dias is a 89 y.o. male here in follow up.  Recent changes in medications based on increased creatinine.  Losartan was stopped.  Carvedilol was reduced.  Hydralazine was added.  He felt weak and malaise with this agent and so stopped it.  No unusual dyspnea, or cough, or nausea.  No focal neurologic symptoms.     REVIEW OF SYSTEMS:   See HPI, all other systems on review are negative.    Past Medical History:   Diagnosis Date     Angiodysplasia of colon      Atrial fibrillation (H)     Resolved after ablation      Chronic systolic heart failure (H) 10/4/2018     Coronary artery disease      History of MI  "(myocardial infarction)      History of polymyalgia rheumatica      History of ventricular tachycardia      Hyperlipidemia      Hypertension      Rheumatoid arthritis (H)      Status cardiac pacemaker      Social History     Tobacco Use   Smoking Status Never Smoker   Smokeless Tobacco Never Used     Family History   Problem Relation Age of Onset     Colon cancer Mother          age 77     Heart attack Father          age 77     Past Surgical History:   Procedure Laterality Date     CARDIAC ELECTROPHYSIOLOGY MAPPING AND ABLATION       CARDIAC PACEMAKER PLACEMENT      ICD combined     CERVICAL DISCECTOMY       VITALS:  Vitals:    20 1103   BP: 106/60   Patient Site: Right Arm   Patient Position: Sitting   Cuff Size: Adult Regular   Pulse: 74   Resp: 16   SpO2: 98%   Weight: 159 lb (72.1 kg)   Height: 5' 10\" (1.778 m)     Wt Readings from Last 3 Encounters:   20 159 lb (72.1 kg)   19 163 lb 11.2 oz (74.3 kg)   19 162 lb 12.8 oz (73.8 kg)     PHYSICAL EXAM:  Constitutional:  In NAD, alert and oriented  Cardiac:  S1 S2   Lungs: Clear  Abdomen:   Soft, flat and non-tender, Neurologic:  Speech clear, no arm or leg weakness, gait normal     Psychiatric:  Mood and behavior appropriate, thinking is clear.     DECISION TO OBTAIN OLD RECORDS AND/OR OBTAIN HISTORY FROM SOMEONE OTHER THAN PATIENT, AND/OR ACCESSING CARE EVERYWHERE):  1  Reviewed recent [facility] evaluation     REVIEW AND SUMMARIZATION OF OLD RECORDS, AND/OR OBTAINING HISTORY FROM SOMEONE OTHER THAN PATIENT, AND/OR DISCUSSION OF CASE WITH ANOTHER HEALTH CARE PROVIDER:  2 0    REVIEW AND/OR ORDER OF OF CLINICAL LAB TESTS: 1  ordered.    REVIEW AND/OR ORDER OF RADIOLOGY TESTS: 1 0.    REVIEW AND/OR ORDER OF MEDICAL TESTS (EKG/ECHO/COLONOSCOPY/EGD): 1  0    INDEPENDENT  VISUALIZATION OF IMAGE, TRACING, OR SPECIMEN ITSELF (2 EACH):  0     TOTAL: 2    Current Outpatient Medications   Medication Sig Dispense Refill     aspirin 81 MG EC " tablet Take 81 mg by mouth daily.       carvedilol (COREG) 6.25 MG tablet Take 2 tablets (12.5 mg total) by mouth 2 (two) times a day with meals. (Patient taking differently: Take 6.25 mg by mouth 2 (two) times a day with meals. ) 360 tablet 3     cholecalciferol, vitamin D3, (VITAMIN D3) 1,000 unit capsule Take 1 capsule (1,000 Units total) by mouth daily. 90 capsule 3     docusate sodium (COLACE) 100 MG capsule Take 1 capsule (100 mg total) by mouth 2 (two) times a day. 30 capsule 3     furosemide (LASIX) 40 MG tablet TAKE 2 TABLETS BY MOUTH EVERY MORNING AND 1 TABLET EVERY EVENING (Patient taking differently: 40 mg daily. TAKE 1 TABLET BY MOUTH EVERY MORNING AND 1 additional TABLET   for weight increase of >3lb in day or 5lb at night) 270 tablet 2     hydrOXYzine HCL (ATARAX) 25 MG tablet Take 1 tablet (25 mg total) by mouth 3 (three) times a day as needed for itching. 60 tablet 3     isosorbide mononitrate (IMDUR) 60 MG 24 hr tablet TAKE 1 TABLET BY MOUTH EVERY DAY 90 tablet 1     loratadine (CLARITIN) 10 mg tablet Take 1 tablet (10 mg total) by mouth daily. 90 tablet 3     losartan (COZAAR) 50 MG tablet TAKE 1 TABLET(50 MG) BY MOUTH DAILY 90 tablet 3     nitroglycerin (NITROSTAT) 0.4 MG SL tablet Place 1 tablet (0.4 mg total) under the tongue every 5 (five) minutes as needed for chest pain. 30 tablet prn     peg 400-propylene glycol (SYSTANE) 0.4-0.3 % Drop Administer 1 drop to both eyes 4 (four) times a day as needed. 20 mL 3     potassium chloride (MICRO-K) 10 mEq CR capsule TAKE 1 CAPSULE BY MOUTH EVERY DAY (Patient taking differently: 1 Tab daily - take additional tab if takes additional Furosemide) 90 capsule 1     simvastatin (ZOCOR) 20 MG tablet TAKE 1 TABLET BY MOUTH EVERY NIGHT AT BEDTIME 90 tablet 1     spironolactone (ALDACTONE) 25 MG tablet TAKE 1 TABLET BY MOUTH DAILY 90 tablet 1     triamcinolone (KENALOG) 0.1 % cream Apply topically 2 (two) times a day.    **[facility] @ [phone]** 30 g 2      hydrALAZINE (APRESOLINE) 10 MG tablet TK 4 TS PO TID       Ashish Morley MD  Internal Medicine  [facility]

## 2021-06-13 NOTE — PROGRESS NOTES
[facility] Follow Up Note    Isaac Dias   86 y.o. male    Date of Visit: 10/11/2017    Chief Complaint   Patient presents with     Follow-up     Subjective  Isaac comes in today accompanied by his son.  I saw him last week and he was having lightheadedness and dizziness.  He appeared to be dehydrated.  We cut back on his furosemide from 80 to 40 mg daily and have him stop his spironolactone.  He is feeling better and his weight is up 5 pounds.  He did have an episode of shortness of breath after doing a lot of exertional work.  He took an extra dose of furosemide yesterday after this occurred and he feels better today.  He is able to lie flat at night.  He has had no ankle swelling.      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       Medications were reconciled.  Allergies, social and family history, and the problem list were all reviewed and updated.      Exam  General Appearance: Pleasant and alert  Vitals:    10/11/17 1220   BP: 108/60   Pulse: 91   SpO2: 96%   Weight: 179 lb 6.4 oz (81.4 kg)      Body mass index is 25.74 kg/(m^2).  Wt Readings from Last 3 Encounters:   10/11/17 179 lb 6.4 oz (81.4 kg)   10/04/17 174 lb (78.9 kg)   03/23/17 175 lb 9.6 oz (79.7 kg)     HEENT: Sclera are clear.   Lungs: Normal respirations clear to auscultation  Cardiac: Regular rate and rhythm  Abdomen: Soft and nondistended  Extremities: No edema  Skin: No rashes  Neuro: Moves all extremities and has facial symmetry  Gait: Ambulates with a normal gait    Assessment/Plan  1. Congestive heart failure  Appears to be euvolemic today.  Will check labs.  We will have him alter furosemide 80 with 40 mg by taking 80 mg on Monday Wednesdays and Fridays.  If his potassium is on the low end of normal today, may need to add  some potassium back or at the spironolactone back.  Continue with staying better hydrated.  - Basic Metabolic Panel    2. Dizziness  Resolved with increasing fluids.          Myriam Pascual MD  10/11/2017    Much or all of the text in this note was generated through the use of Dragon Dictate voice-to-text software. Errors in spelling or words which seem out of context are unintentional. Sound alike errors, in particular, may have escaped editing.

## 2021-06-13 NOTE — PROGRESS NOTES
[facility] Follow Up Note    Isaac Dias   86 y.o. male    Date of Visit: 10/18/2017    Chief Complaint   Patient presents with     Follow-up     CHF and medication management     Subjective  Isaac comes in today for follow-up after we have been adjusting his medications.  He was originally seen by me on October 4 complaining of dizziness and he appeared to be dehydrated and over diuresed.  We cut back on his diuretics and then he followed up with me on October 11 and he was starting to have some mild shortness of breath with exertion.  We added back a little bit of the furosemide.  They called in here a few days ago due to difficulty lying flat at night and we have him currently on 80 mg twice a day.  He has now felt better over the last day or 2.  He has been trying to drink more fluids which is likely what got him into trouble in the first place by being dehydrated.  He is weighing himself more frequently.  He is feeling less bloated.  He usually does not get ankle swelling when he retains fluid.    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       Medications were reconciled.  Allergies, social and family history, and the problem list were all reviewed and updated.    Old records reviewed: Previous notes.    Exam  General Appearance: Pleasant and alert  Vitals:    10/18/17 1132   BP: 102/54   Patient Site: Left Arm   Patient Position: Sitting   Cuff Size: Adult Regular   Pulse: 85   Weight: 176 lb 6.4 oz (80 kg)      Body mass index is 25.31 kg/(m^2).  Wt Readings from Last 3 Encounters:   10/18/17 176 lb 6.4 oz (80 kg)   10/11/17 179 lb 6.4 oz (81.4 kg)   10/04/17 174 lb (78.9 kg)     HEENT: Sclera are clear.   Lungs: Normal respirations, clear to auscultation  Cardiac: Regular rate and  rhythm  Abdomen: Soft and nondistended  Extremities: No edema  Skin: No rashes  Neuro: Moves all extremities and has facial symmetry  Gait: Ambulates with a normal gait    Assessment/Plan  1. CHF (congestive heart failure)  We will have him go back to 80 mg in the morning and 40 mg around 2:00 of the furosemide.  Continue with potassium and spironolactone supplements and check labs today.  We will have him follow-up with me in about a month.  We talked about hydration and appropriate amounts of liquid to be drinking.  - Basic Metabolic Panel    2. Hypertension  Stable on medication.          Myriam Pascual MD  10/18/2017    Much or all of the text in this note was generated through the use of Dragon Dictate voice-to-text software. Errors in spelling or words which seem out of context are unintentional. Sound alike errors, in particular, may have escaped editing.                   present

## 2021-06-13 NOTE — PROGRESS NOTES
[facility] Follow Up Note    Isaac Dias   86 y.o. male    Date of Visit: 10/4/2017    Chief Complaint   Patient presents with     Dizziness     Subjective  Isaac comes in today accompanied by his son.  I have not seen him in over a year.  He is followed by cardiology at [facility] and was last seen in June.  He comes in as the last 3-4 days he has been feeling dizzy.  If he bends it down and then sits back up he feels dizzy or lightheaded.  If he stands up such as getting out of bed first thing in the morning he feels very lightheaded and dizzy.  He had been working out of the yard when it was really hot out several days ago.  He admits he just has a couple sips of water a few times a day and does not really drink much.  He is on chronic diuretics and blood pressure pills due to his underlying heart problems.  He denies any spinning sensation, upper respiratory infections or other problems.  He does have a chronic pacemaker.    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       Medications were reconciled.  Allergies, social and family history, and the problem list were all reviewed and updated.    Old records reviewed: Records from [facility] including last echocardiogram, last blood test, last office visit notes from cardiology and the nurse practitioner in the device clinic    Exam  General Appearance: Pleasant and alert  Vitals:    10/04/17 1458   BP: 104/56   Patient Site: Left Arm   Patient Position: Sitting   Cuff Size: Adult Regular   Pulse: 81   SpO2: 97%   Weight: 174 lb (78.9 kg)      Body mass index is 24.97 kg/(m^2).  Wt Readings from Last 3 Encounters:   10/04/17 174 lb (78.9 kg)   03/23/17 175 lb 9.6 oz (79.7 kg)   09/21/16 170 lb (77.1 kg)     HEENT: Sclera are clear.    Lungs: Normal respirations  Cardiac: Regular rate and rhythm  Abdomen: Soft and nondistended  Extremities: No edema  Skin: No rashes, significant tenting of his skin  Neuro: Moves all extremities and has facial symmetry  Gait: Ambulates with a normal gait    Assessment/Plan  1. Dizziness  Suspect he is dehydrated.  We will have him hold all of his furosemide tomorrow and then started half his current dose on Friday, 2 days from now.  He will stop his spironolactone and his potassium.  See me in 1 week to recheck his weight.  We discussed watching his weight and to report any problems with breathing or swelling of his ankles.    2. Hypertension  As above.  - Basic Metabolic Panel  - HM2(CBC w/o Differential)  - Thyroid Stimulating Hormone (TSH)  - T4, Free    3. Congestive heart failure  Last echocardiogram was in 2015 at [facility] showing an ejection fraction of 25-30%.          Myriam Pascual MD  10/4/2017    Much or all of the text in this note was generated through the use of Dragon Dictate voice-to-text software. Errors in spelling or words which seem out of context are unintentional. Sound alike errors, in particular, may have escaped editing.

## 2021-06-14 NOTE — PATIENT INSTRUCTIONS - HE
1. Take 2 or 3 furosemide water pill, as needed and as discussed.     2. Take 2 iron pills daily.     3. Follow up in one month.

## 2021-06-14 NOTE — TELEPHONE ENCOUNTER
Reason for Call: Request for an order or referral:    Order or referral being requested: [facility] ORDER FOR INFUSION NEED TO BE CLARIFIED BEFORE PATIENT CAN BE SCHEDULED     [facility] HAVE NOT GOTTEN ORDERS  PLEASE CALL [facility] WITH ORDERS AND CLARIFICATION  Date needed: as soon as possible    Has the patient been seen by the PCP for this problem? YES    Additional comments: PLEASE CALL ASAP    Phone number Patient can be reached at:    Cell number on file:    Telephone Information:   Mobile [phone]       Best Time:  ANYTIME    Can we leave a detailed message on this number?  Yes    Call taken on 1/11/2021 at 9:40 AM by Iman Kim

## 2021-06-14 NOTE — PROGRESS NOTES
Medication Therapy Management (MTM) Encounter    Assessment:                                                      1. Chronic systolic heart failure (H)  Symptomatically improved, adherent to medications as directed at discharge.  He will be following up with cardiology as directed post discharge.  Recommend to continue current regimen.    2. Atherosclerosis of coronary artery without angina pectoris, unspecified vessel or lesion type, unspecified whether native or transplanted heart  Stable, continues on aspirin, statin, beta-blocker, ARB, long-acting nitrate tolerated without adverse effects, .  Spoke with pharmacy and confirmed that he will be able to  isosorbide.  Recommend continue current regimen.    3. Iron deficiency anemia, unspecified iron deficiency anemia type  Improved post infusion, however question on whether he should resume his oral iron supplement in addition.  I have encouraged him to discuss this further with PCP tomorrow.  Due to ongoing concerns of constipation, recommended to utilize senna in addition to his stool softener over-the-counter.    4. Vitamin D deficiency  Stable on daily vitamin D supplementation, in the future consider evaluating vitamin D level to ensure therapeutic.    5. Seasonal allergic rhinitis, unspecified trigger  Stable. Recommended to continue current regimen.     6. Anxiety  Symptomatically improved for both anxiety and sleep after resuming low-dose mirtazapine.  Reviewed benefits versus risks of this medication, and potential need for dose adjustment.  Tolerating without adverse effects, recommend to continue and reevaluate with PCP tomorrow.    7. Xerosis cutis  Symptomatically improved, reviewed risks of utilizing hydroxyzine, particularly at night and increasing risk of falls.  Therefore recommend to discontinue hydroxyzine if not needed, using topical products instead.      Plan:                                                     1. Educate to utilize senna  as needed for constipation due to oral iron supplement  2. Educate to discontinue hydroxyzine if not needed     Follow Up  Return in about 1 day (around 1/20/2021) for with PCP.    Subjective & Objective                                                       Isaac Dias is a 89 y.o. male called for a transitions of care visit. he was discharged from [facility] on 1/17/2021 for Acute on chronic systolic heart failure. Appointment with PCP tomorrow.  Appointment was completed between patient and his daughter.    Patient consented to a telehealth visit: Yes    Chief Complaint: Hospital Follow up     Medication Adherence/Access: Stable, no issues identified.    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  He is working on adhering to a low-salt diet and monitoring weights.  His home torsemide dose was increased to 60 mg daily by cardiology.  He is taking potassium 20 mEq daily.  He continues on losartan, simvastatin, spironolactone.  Denies signs or symptoms of adverse effects.  His ejection fraction was reported as 26%.  He will be following up with his primary doctor tomorrow, and with the heart failure clinic in 1 to 2 weeks.    Coronary artery disease: He continues on aspirin and carvedilol, however over the last month or so there was a change in his isosorbide dose.  Confirmed that isosorbide was decreased to 30 mg daily by his PCP on January 6, 2021, but I had continued to say 60 mg daily on January 14, 2021 in the cardiology note from [place].  At discharged he was recommended to continue on 30 mg daily.  He was having a hard time getting this from the pharmacy, and would like help for following up on this to ensure he is able to get his medication.  Continues on losartan 25 mg daily, simvastatin 20 mg daily. denies signs or symptoms of adverse effects.  Lab Results   Component  Value Date    LDLCALC 65 08/30/2016     Chronic microcytic anemia: He was wondering if he was supposed to stop his iron pill, after getting blood transfusion as outpatient on January 6, with a hemoglobin of 7.8.  Lab Results   Component Value Date    HGB 10.0 (L) 01/15/2021     Vitamin D deficiency: He is taking vitamin D 1000 units daily.    Allergic rhinitis: Notes that he only uses loratadine on an as-needed basis, and is not currently using it.    Anxiety: He had been having some difficulty with sleep since getting home, however last night his sleep was better after resuming 7.5 mg mirtazapine which he had started on prior to the hospital.  He did have an improved night of sleep last night, denied waking up with excessive somnolence.     Eczema: using hydroxyzine at night for itch all over. Itching is better. Using hydroxyzine as needed and putting a salve on called mometasone.     PMH: reviewed in EPIC   Allergies/ADRs: reviewed in EPIC   Alcohol: None  Tobacco:   Social History     Tobacco Use   Smoking Status Never Smoker   Smokeless Tobacco Never Used     Recent Vitals:   BP Readings from Last 3 Encounters:   01/20/21 (!) 84/56   01/17/21 109/70   01/13/21 101/61      Wt Readings from Last 3 Encounters:   01/20/21 151 lb 8 oz (68.7 kg)   01/17/21 148 lb (67.1 kg)   01/06/21 153 lb 8 oz (69.6 kg)     ----------------  Post Discharge Medication Reconciliation Status: discharge medications reconciled, continue medications without change    The patient declined an after visit summary    I spent 37 minutes with this patient today;  . All changes were made via collaborative practice agreement with Ashish Morley MD. A copy of the visit note was provided to the patient's provider.     Royer Ferris, PharmD, BCACP  Medication Management (MTM) Pharmacist  [facility]    Telemedicine Visit Details    Type of service:  Telephone     Start Time: 9AM  End Time (time video/phone call stopped):  9:37AM    Originating Location (pt. Location): Home    Distant Location (provider location):  [facility]    Mode of Communication:   Telephone     Current Outpatient Medications   Medication Sig Dispense Refill     aspirin 81 MG EC tablet Take 81 mg by mouth daily.       carboxymethylcellulose (REFRESH PLUS) 0.5 % Dpet ophthalmic dropperette Administer 1 drop to both eyes 4 (four) times a day as needed.       carvediloL (COREG) 6.25 MG tablet Take 1 tablet (6.25 mg total) by mouth 2 (two) times a day with meals.  0     cholecalciferol, vitamin D3, (VITAMIN D3) 1,000 unit capsule Take 1 capsule (1,000 Units total) by mouth daily. 90 capsule 3     docusate sodium (COLACE) 100 MG capsule Take 1 capsule (100 mg total) by mouth 2 (two) times a day. 30 capsule 3     isosorbide mononitrate (IMDUR) 30 MG 24 hr tablet Take 1 tablet (30 mg total) by mouth daily. 30 tablet 0     losartan (COZAAR) 25 MG tablet Take 25 mg by mouth daily.       mirtazapine (REMERON) 7.5 MG tablet Take 7.5 mg by mouth at bedtime.       mometasone (ELOCON) 0.1 % cream Apply 1 application topically as needed.       nitroglycerin (NITROSTAT) 0.4 MG SL tablet Place 1 tablet (0.4 mg total) under the tongue every 5 (five) minutes as needed for chest pain. 30 tablet prn     polyethylene glycol (MIRALAX) 17 gram packet Take 17 g by mouth daily.       potassium chloride (K-DUR,KLOR-CON) 10 MEQ tablet Take 20 mEq by mouth daily.       torsemide (DEMADEX) 20 MG tablet Take 3 tablets (60 mg total) by mouth daily.  0     vit C,X-Ub-aoxaa-lutein-zeaxan (PRESERVISION AREDS-2) 933-255-15-1 mg-unit-mg-mg cap Take 1 capsule by mouth 2 (two) times a day.       loratadine (CLARITIN) 10 mg tablet Take 1 tablet (10 mg total) by mouth daily as needed for allergies. 90 tablet 3     potassium chloride (MICRO-K) 10 mEq CR capsule 1 Tab daily - take additional tab if takes additional Furosemide 90 capsule 3     simvastatin (ZOCOR)  20 MG tablet Take 1 tablet (20 mg total) by mouth at bedtime. 90 tablet 1     spironolactone (ALDACTONE) 25 MG tablet Take 1 tablet (25 mg total) by mouth daily. 90 tablet 1     No current facility-administered medications for this visit.         Medication Therapy Recommendations  Chronic systolic heart failure (H)    Rationale: Preventive therapy - Needs additional medication therapy - Indication   Recommendation: Start Medication - Educated to utilize senna as needed for constipation   Status: Patient Agreed - Adherence/Education         Xerosis cutis    Rationale: Does not understand instructions - Adherence - Adherence   Recommendation: Provide Education - Provide education to discontinue hydroxyzine   Status: Patient Agreed - Adherence/Education

## 2021-06-14 NOTE — PATIENT INSTRUCTIONS - HE
1. Decrease the isosorbide mononitrate to 1/2 pill (30mg) each day.    2. Blood tests today.     3. Follow up in one month.

## 2021-06-14 NOTE — PROGRESS NOTES
[facility] Follow Up Note    Isaac Dias   86 y.o. male    Date of Visit: 11/17/2017    Chief Complaint   Patient presents with     Follow-up     medication management     Subjective  Isaac comes in today accompanied by his son.  He is doing quite well on his current regimen of medication and his weight has been stable at home.  He denies any shortness of breath.    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       Medications were reconciled.  Allergies, social and family history, and the problem list were all reviewed and updated.      Exam  General Appearance: Pleasant and alert  Vitals:    11/17/17 1107   BP: 116/62   Patient Site: Left Arm   Patient Position: Sitting   Cuff Size: Adult Regular   Pulse: 73   Weight: 179 lb 9.6 oz (81.5 kg)      Body mass index is 25.77 kg/(m^2).  Wt Readings from Last 3 Encounters:   11/17/17 179 lb 9.6 oz (81.5 kg)   10/18/17 176 lb 6.4 oz (80 kg)   10/11/17 179 lb 6.4 oz (81.4 kg)     HEENT: Sclera are clear.   Lungs: Normal respirations  Cardiac: Regular rate and rhythm  Abdomen: Soft and nondistended  Extremities: No edema  Skin: No rashes  Neuro: Moves all extremities and has facial symmetry  Gait: Ambulates with a normal gait    Assessment/Plan  1. Encounter for medication management  Well on his current medications, will continue the current dosing.    2. Acute on chronic combined systolic and diastolic congestive heart failure  Appears euvolemic today.  - Basic Metabolic Panel    3. Hypertension  Blood pressure is stable.          Myriam Pascual MD  11/17/2017    Much or all of the text in this note was generated through the use of Dragon Dictate voice-to-text software. Errors in spelling or words which seem out of context are unintentional. Sound alike  errors, in particular, may have escaped editing.

## 2021-06-14 NOTE — PROGRESS NOTES
ASSESSMENT AND PLAN:    1. Chronic systolic heart failure   Now improved after hospitalization.     2. Iron deficiency anemia, unspecified iron deficiency anemia type  Recent transfusion.  They did not administer the furosemide between the units as requested.  He needed hospitalization for acute on chronic CHF.  Now feeling better.  Last colonoscopy was 2014. Small polyps then.  We discussed the small risk of colon cancer as causing the recurring anemia.  He had this before with transfusions in 3/2017. He declines for now.  Colonoscopy would be difficult and risk/benefit is not strong.  He will increase his iron to two times a day.  Recheck hemoglobin in 1 month.      Post Discharge Medication Reconciliation Status: discharge medications reconciled and changed, per note/orders    Patient Instructions   1. Take 2 or 3 furosemide water pill, as needed and as discussed.     2. Take 2 iron pills daily.     3. Follow up in one month.     CHIEF COMPLAINT:  Chief Complaint   Patient presents with     Hospital Visit Follow Up     [facility] 1/15-1/17 Heart failure     HISTORY OF PRESENT ILLNESS:  Isaac Dias is a 89 y.o. male here in follow up. Feels much better now with hospitalization for acute on chronic heart failure following transfusions.  (they did not give him the furosemide as directed). His energy has improved, sleeping OK.  No chest or abdominal pain.  Voiding OK.  Told to take the furosemide 60 mg po daily.      REVIEW OF SYSTEMS:   See HPI, all other systems on review are negative.    Past Medical History:   Diagnosis Date     Anemia      Angiodysplasia of colon      Anxiety 1/6/2021     Atrial fibrillation (H)     Resolved after ablation      Chronic systolic heart failure (H) 10/4/2018     Coronary artery disease      History of MI (myocardial infarction)      History of polymyalgia rheumatica      History of ventricular tachycardia      Hyperlipidemia      Hypertension      Iron deficiency anemia  2021     Rheumatoid arthritis (H)      Status cardiac pacemaker      Social History     Tobacco Use   Smoking Status Never Smoker   Smokeless Tobacco Never Used     Family History   Problem Relation Age of Onset     Colon cancer Mother          age 77     Heart attack Father          age 77     Past Surgical History:   Procedure Laterality Date     CARDIAC ELECTROPHYSIOLOGY MAPPING AND ABLATION       CARDIAC PACEMAKER PLACEMENT      ICD combined     CERVICAL DISCECTOMY       VITALS:  Vitals:    21 1158   BP: (!) 84/56   Patient Site: Left Arm   Patient Position: Sitting   Cuff Size: Adult Regular   Pulse: 84   Resp: 20   SpO2: 98%   Weight: 151 lb 8 oz (68.7 kg)   Height: 6' (1.829 m)     Wt Readings from Last 3 Encounters:   21 151 lb 8 oz (68.7 kg)   21 148 lb (67.1 kg)   21 153 lb 8 oz (69.6 kg)     PHYSICAL EXAM:  Constitutional:  In NAD, alert and oriented  Cardiac:  S1 S2   Lungs: Clear  Extremities: no edema    DECISION TO OBTAIN OLD RECORDS AND/OR OBTAIN HISTORY FROM SOMEONE OTHER THAN PATIENT, AND/OR ACCESSING CARE EVERYWHERE):  1  0     REVIEW AND SUMMARIZATION OF OLD RECORDS, AND/OR OBTAINING HISTORY FROM SOMEONE OTHER THAN PATIENT, AND/OR DISCUSSION OF CASE WITH ANOTHER HEALTH CARE PROVIDER:  2 reviewed discharge summary    REVIEW AND/OR ORDER OF OF CLINICAL LAB TESTS: 1  Reviewed renal function tests.    REVIEW AND/OR ORDER OF RADIOLOGY TESTS: 1 0.    REVIEW AND/OR ORDER OF MEDICAL TESTS (EKG/ECHO/COLONOSCOPY/EGD): 1  0    INDEPENDENT  VISUALIZATION OF IMAGE, TRACING, OR SPECIMEN ITSELF (2 EACH):  0     TOTAL: 3    Current Outpatient Medications   Medication Sig Dispense Refill     aspirin 81 MG EC tablet Take 81 mg by mouth daily.       carboxymethylcellulose (REFRESH PLUS) 0.5 % Dpet ophthalmic dropperette Administer 1 drop to both eyes 4 (four) times a day as needed.       carvediloL (COREG) 6.25 MG tablet Take 1 tablet (6.25 mg total) by mouth 2 (two) times a day  with meals.  0     cholecalciferol, vitamin D3, (VITAMIN D3) 1,000 unit capsule Take 1 capsule (1,000 Units total) by mouth daily. 90 capsule 3     docusate sodium (COLACE) 100 MG capsule Take 1 capsule (100 mg total) by mouth 2 (two) times a day. 30 capsule 3     isosorbide mononitrate (IMDUR) 30 MG 24 hr tablet Take 1 tablet (30 mg total) by mouth daily. 30 tablet 0     loratadine (CLARITIN) 10 mg tablet Take 1 tablet (10 mg total) by mouth daily. 90 tablet 3     losartan (COZAAR) 25 MG tablet Take 25 mg by mouth daily.       mirtazapine (REMERON) 7.5 MG tablet Take 7.5 mg by mouth at bedtime.       mometasone (ELOCON) 0.1 % cream Apply 1 application topically as needed.       nitroglycerin (NITROSTAT) 0.4 MG SL tablet Place 1 tablet (0.4 mg total) under the tongue every 5 (five) minutes as needed for chest pain. 30 tablet prn     polyethylene glycol (MIRALAX) 17 gram packet Take 17 g by mouth daily.       potassium chloride (K-DUR,KLOR-CON) 10 MEQ tablet Take 20 mEq by mouth daily.       potassium chloride (MICRO-K) 10 mEq CR capsule 1 Tab daily - take additional tab if takes additional Furosemide 90 capsule 3     simvastatin (ZOCOR) 20 MG tablet Take 1 tablet (20 mg total) by mouth at bedtime. 90 tablet 1     spironolactone (ALDACTONE) 25 MG tablet Take 1 tablet (25 mg total) by mouth daily. 90 tablet 1     torsemide (DEMADEX) 20 MG tablet Take 3 tablets (60 mg total) by mouth daily.  0     vit C,N-Ak-dguva-lutein-zeaxan (PRESERVISION AREDS-2) 178-541-03-1 mg-unit-mg-mg cap Take 1 capsule by mouth 2 (two) times a day.       Ashish Morley MD  Internal Medicine  [facility]

## 2021-06-14 NOTE — TELEPHONE ENCOUNTER
Daughter, Hilaria, calling; patient not present.  States patient received blood transfusion on 1/13/21 and felt okay yesterday.  Daughter spoke with patient this morning and he said he has having some trouble breathing; had not yet taken his prescribed lasix.  FNA connected daughter with Infusion at [facility] per number listed in visit note of 1/13/21.    Heidi Perales RN  [facility] Triage Nurse Advisor

## 2021-06-14 NOTE — PROGRESS NOTES
"Patient arrived ambulatory (accompanied by his daughter, Hliaria) at 08:05 - seated in chair 1. Reviewed plan of care with the patient and his daughter - explained the process of receiving a blood transfusion. \"I am so tired, my most recent hgb was 7.8, it dropped 2 points - and do not know why.\" Patient has declined a COVID test today. Signed blood consent is on the chart.    Placed 20g IV in the RAC - the type and screen was drawn. NS was used as the primary IV solution. Patient was transfused with two units of RBCs. /61   Pulse 70   Temp 97.5  F (36.4  C) (Oral)   Resp 16   SpO2 100%     Patient's highest blood pressure throughout the course of the day, was 101/61. Patient states he did take his lasix this morning before coming. Assisted to the bathroom x 1. Napped at intervals. Ate lunch. Tolerated the transfusion well, with no ill effects noted. Discharge instructions were given verbally to the patient and his daughter (Hilaria). Instructed patient to make a follow up appointment with Dr. Morley. In addition, the post transfusion AVS was given and explained. At 15:09 this writer took the patient in stable condition via w/c to awaiting vehicle.    Ayanna Iglesias RN  "

## 2021-06-14 NOTE — TELEPHONE ENCOUNTER
Refill Approved    Rx renewed per Medication Renewal Policy. Medication was last renewed on 01/18/2021    Barbara Sidhu, [facility] Connection Triage/Med Refill 2/3/2021     Requested Prescriptions   Pending Prescriptions Disp Refills     isosorbide mononitrate (IMDUR) 30 MG 24 hr tablet 30 tablet 0     Sig: Take 1 tablet (30 mg total) by mouth daily.       There is no refill protocol information for this order                    Reason for Disposition    Caller requesting a refill, no triage required, and triager able to refill per department policy    Protocols used: MEDICATION QUESTION CALL-A-[place]

## 2021-06-14 NOTE — TELEPHONE ENCOUNTER
RN cannot approve Refill Request    RN can NOT refill this medication Is patient still on K-Dur? Was discontinued in Nov. Imdur is a duplicate rx. Sent in on 2021. Last office visit: Visit date not found Last Physical: Visit date not found Last MTM visit: Visit date not found Last visit same specialty: 2021 Ashish Morley MD.  Next visit within 3 mo: Visit date not found  Next physical within 3 mo: Visit date not found      Ana Isaac, [facility] Connection Triage/Med Refill 2021    Requested Prescriptions   Pending Prescriptions Disp Refills     potassium chloride (MICRO-K) 10 mEq CR capsule [Pharmacy Med Name: POTASSIUM CL 10MEQ ER CAPSULES] 90 capsule 3     Si Tab daily - take additional tab if takes additional Furosemide       Potassium Supplements Refill Protocol Passed - 2021 10:52 AM        Passed - PCP or prescribing provider visit in past 12 months       Last office visit with prescriber/PCP: Visit date not found OR same dept: 2021 Ashish Morley MD OR same specialty: 2021 Ashish Morley MD  Last physical: Visit date not found Last MTM visit: Visit date not found   Next visit within 3 mo: Visit date not found  Next physical within 3 mo: Visit date not found  Prescriber OR PCP: Markus Loco MD  Last diagnosis associated with med order: 1. Hypokalemia  - potassium chloride (MICRO-K) 10 mEq CR capsule [Pharmacy Med Name: POTASSIUM CL 10MEQ ER CAPSULES]; 1 Tab daily - take additional tab if takes additional Furosemide  Dispense: 90 capsule; Refill: 3    2. HTN (hypertension)    3. Atherosclerosis of coronary artery without angina pectoris, unspecified vessel or lesion type, unspecified whether native or transplanted heart  - isosorbide mononitrate (IMDUR) 30 MG 24 hr tablet; Take 1 tablet (30 mg total) by mouth daily.  Dispense: 90 tablet; Refill: 3    4. Hyperlipidemia    If protocol passes may refill for 12 months if within 3 months of last provider visit (or a  total of 15 months).             Passed - Potassium level in last 12 months     Lab Results   Component Value Date    Potassium 3.7 01/17/2021                isosorbide mononitrate (IMDUR) 30 MG 24 hr tablet 90 tablet 3     Sig: Take 1 tablet (30 mg total) by mouth daily.       Isosorbide Refill Protocol Passed - 1/19/2021 10:52 AM        Passed - Visit with PCP or prescribing provider visit in last 6 months or next 3 months     Last office visit with prescriber/PCP: Visit date not found OR same dept: 1/6/2021 Ashish Morley MD OR same specialty: 1/6/2021 Ashish Morley MD Last physical: Visit date not found Last MTM visit: Visit date not found     Next appt within 3 mo: Visit date not found  Next physical within 3 mo: Visit date not found  Prescriber OR PCP: Markus Loco MD  Last diagnosis associated with med order: 1. Hypokalemia  - potassium chloride (MICRO-K) 10 mEq CR capsule [Pharmacy Med Name: POTASSIUM CL 10MEQ ER CAPSULES]; 1 Tab daily - take additional tab if takes additional Furosemide  Dispense: 90 capsule; Refill: 3    2. HTN (hypertension)    3. Atherosclerosis of coronary artery without angina pectoris, unspecified vessel or lesion type, unspecified whether native or transplanted heart  - isosorbide mononitrate (IMDUR) 30 MG 24 hr tablet; Take 1 tablet (30 mg total) by mouth daily.  Dispense: 90 tablet; Refill: 3    4. Hyperlipidemia    If protocol passes may refill for 6 months if within 3 months of last provider visit (or a total of 9 months).              Passed - Blood pressure filed in past 12 months     BP Readings from Last 1 Encounters:   01/17/21 109/70

## 2021-06-14 NOTE — TELEPHONE ENCOUNTER
Pt daughter called in states Pt has nausea.  The symptom started yesterday after blood transfusion.  The symptom is mild.  No vomit.  The caller states she didn't know cause of the nausea.      Reason for Disposition    Unexplained nausea    Additional Information    Negative: Shock suspected (e.g., cold/pale/clammy skin, too weak to stand, low BP, rapid pulse)    Negative: Sounds like a life-threatening emergency to the triager    Negative: [1] Nausea or vomiting AND [2] pregnancy < 20 weeks    Negative: Menstrual Period - Missed or Late (i.e., pregnancy suspected)    Negative: Heat exhaustion suspected (i.e., dehydration from heat exposure)    Negative: Motion sickness suspected (i.e., nausea with car, plane, boat, or train travel)    Negative: Anxiety or stress suspected (i.e., nausea with anxiety attacks or stressful situations)    Negative: Traumatic Brain Injury (TBI) suspected    Negative: Nausea (or Vomiting) in a cancer patient who is currently (or recently) receiving chemotherapy or radiation therapy, or cancer patient who has metastatic or end-stage cancer and is receiving palliative care    Negative: Vomiting occurs    Negative: Other symptom is present, see that guideline.  (e.g., chest pain, headache, dizziness, abdominal pain, colds, sore throat, etc.).    Negative: Unable to walk, or can only walk with assistance (e.g., requires support)    Negative: Difficulty breathing    Negative: [1] Insulin-dependent diabetes (Type I) AND [2] glucose > 400 mg/dl (22 mmol/l)    Negative: [1] Drinking very little AND [2] dehydration suspected (e.g., no urine > 12 hours, very dry mouth, very lightheaded)    Negative: Patient sounds very sick or weak to the triager    Negative: Fever > 104 F (40 C)    Negative: [1] Fever > 101 F (38.3 C) AND [2] age > 60    Negative: [1] Fever > 100.0 F (37.8 C) AND [2] bedridden (e.g., nursing home patient, CVA, chronic illness, recovering from surgery)    Negative: [1] Fever >  100.0 F (37.8 C) AND [2] diabetes mellitus or weak immune system (e.g., HIV positive, cancer chemo, splenectomy, organ transplant, chronic steroids)    Negative: Taking any of the following medications: digoxin (Lanoxin), lithium, theophylline, phenytoin (Dilantin)    Negative: Receiving cancer chemotherapy medication    Negative: Taking prescription medication that could cause nausea (e.g., narcotics/opiates, antibiotics, OCPs, many others)    Negative: Yellowish color of the skin or white of the eye (i.e., jaundice)    Negative: Fever present > 3 days (72 hours)    Negative: Nausea lasts > 1 week    Negative: Alcohol or drug abuse, known or suspected    Negative: Nausea is a chronic symptom (recurrent or ongoing AND present > 4 weeks)    Protocols used: NAUSEA-A-AH

## 2021-06-14 NOTE — PROGRESS NOTES
ASSESSMENT AND PLAN:    1. Bilateral impacted cerumen  cleared  - Ear Cerumen Removal-Clinic    2. Chronic systolic heart failure   Moderately severe, but seems compensated. He may be still somewhat over treated with vasodilator therapy.  Will decrease isosorbide to 30 mg po daily and assess renal function, electrolytes and hemoglobin.     3. Anxiety  Mild. With fitful sleep. Will try mirtazapine.      4. Hypertension  Controlled.     5. Chills  Likely related to poor cardiac output, slender build, and age.  Need to evaluate thyroid function and for anemia.     Patient Instructions   1. Decrease the isosorbide mononitrate to 1/2 pill (30mg) each day.    2. Blood tests today.     3. Follow up in one month.     CHIEF COMPLAINT:  Chief Complaint   Patient presents with     Cerumen Impaction     Chills     Anxiety     HISTORY OF PRESENT ILLNESS:  Isaac Dias is a 89 y.o. male with concerns about feeling chilled, and having WALTERS.  No orthopnea, or dyspnea at rest.  No melena.  Feels anxious at times and also has fitful sleep.  No fever, or cough, or actual dizziness.      REVIEW OF SYSTEMS:   See HPI, all other systems on review are negative.    Past Medical History:   Diagnosis Date     Anemia      Angiodysplasia of colon      Anxiety 2021     Atrial fibrillation (H)     Resolved after ablation      Chronic systolic heart failure (H) 10/4/2018     Coronary artery disease      History of MI (myocardial infarction)      History of polymyalgia rheumatica      History of ventricular tachycardia      Hyperlipidemia      Hypertension      Rheumatoid arthritis (H)      Status cardiac pacemaker      Social History     Tobacco Use   Smoking Status Never Smoker   Smokeless Tobacco Never Used     Family History   Problem Relation Age of Onset     Colon cancer Mother          age 77     Heart attack Father          age 77     Past Surgical History:   Procedure Laterality Date     CARDIAC ELECTROPHYSIOLOGY MAPPING  AND ABLATION       CARDIAC PACEMAKER PLACEMENT      ICD combined     CERVICAL DISCECTOMY       VITALS:  Vitals:    01/06/21 1506   BP: 108/60   Patient Site: Left Arm   Patient Position: Sitting   Cuff Size: Adult Regular   Pulse: 72   SpO2: 99%   Weight: 153 lb 8 oz (69.6 kg)     Wt Readings from Last 3 Encounters:   01/06/21 153 lb 8 oz (69.6 kg)   09/21/20 159 lb (72.1 kg)   09/03/19 163 lb 11.2 oz (74.3 kg)     PHYSICAL EXAM:  Constitutional:  In NAD, alert and oriented, pale in complexion  HEENT:  L > R cerumen impaction.   Cardiac:  S1 S2   Lungs: Clear  Abdomen:   Soft, flat and non-tenderExtremities: no joint effusion, no significant edema  Neurologic:  Speech clear, no arm or leg weakness, gait normal     DECISION TO OBTAIN OLD RECORDS AND/OR OBTAIN HISTORY FROM SOMEONE OTHER THAN PATIENT, AND/OR ACCESSING CARE EVERYWHERE):  1  Reviewed [facility] heart visit evaluation     REVIEW AND SUMMARIZATION OF OLD RECORDS, AND/OR OBTAINING HISTORY FROM SOMEONE OTHER THAN PATIENT, AND/OR DISCUSSION OF CASE WITH ANOTHER HEALTH CARE PROVIDER:  2 0    REVIEW AND/OR ORDER OF OF CLINICAL LAB TESTS: 1  ordered.    REVIEW AND/OR ORDER OF RADIOLOGY TESTS: 1 0.    REVIEW AND/OR ORDER OF MEDICAL TESTS (EKG/ECHO/COLONOSCOPY/EGD): 1  Reviewed echocardiogram    INDEPENDENT  VISUALIZATION OF IMAGE, TRACING, OR SPECIMEN ITSELF (2 EACH):  0     TOTAL: 3    Current Outpatient Medications   Medication Sig Dispense Refill     aspirin 81 MG EC tablet Take 81 mg by mouth daily.       carvediloL (COREG) 6.25 MG tablet Take 1 tablet (6.25 mg total) by mouth 2 (two) times a day with meals.  0     cholecalciferol, vitamin D3, (VITAMIN D3) 1,000 unit capsule Take 1 capsule (1,000 Units total) by mouth daily. 90 capsule 3     docusate sodium (COLACE) 100 MG capsule Take 1 capsule (100 mg total) by mouth 2 (two) times a day. 30 capsule 3     ferrous sulfate 325 (65 FE) MG tablet Take 325 mg by mouth.       hydrALAZINE (APRESOLINE) 10 MG tablet TK 4  TS PO TID       hydrOXYzine HCL (ATARAX) 25 MG tablet Take 1 tablet (25 mg total) by mouth 3 (three) times a day as needed for itching. 60 tablet 3     isosorbide mononitrate (IMDUR) 60 MG 24 hr tablet TAKE 1 TABLET BY MOUTH EVERY DAY 90 tablet 1     loratadine (CLARITIN) 10 mg tablet Take 1 tablet (10 mg total) by mouth daily. 90 tablet 3     losartan (COZAAR) 50 MG tablet TAKE 1 TABLET(50 MG) BY MOUTH DAILY 90 tablet 3     mometasone (ELOCON) 0.1 % cream        nitroglycerin (NITROSTAT) 0.4 MG SL tablet Place 1 tablet (0.4 mg total) under the tongue every 5 (five) minutes as needed for chest pain. 30 tablet prn     peg 400-propylene glycol (SYSTANE) 0.4-0.3 % Drop Administer 1 drop to both eyes 4 (four) times a day as needed. 20 mL 3     potassium chloride (K-DUR,KLOR-CON) 10 MEQ tablet Take 20 mEq by mouth.       simvastatin (ZOCOR) 20 MG tablet TAKE 1 TABLET BY MOUTH EVERY NIGHT AT BEDTIME 90 tablet 1     spironolactone (ALDACTONE) 25 MG tablet TAKE 1 TABLET BY MOUTH DAILY 90 tablet 1     torsemide (DEMADEX) 20 MG tablet Take 40 mg by mouth daily.       triamcinolone (KENALOG) 0.1 % cream Apply topically 2 (two) times a day.    **[facility] @ [phone]** 30 g 2     vit C,U-Dm-oxnqs-lutein-zeaxan (PRESERVISION AREDS-2) [phone] mg-unit-mg-mg cap Take 1 capsule by mouth.       furosemide (LASIX) 40 MG tablet Take 1 tablet (40 mg total) by mouth daily. TAKE 1 TABLET BY MOUTH EVERY MORNING AND 1 additional TABLET   for weight increase of >3lb in day or 5lb at night       Ashish Morley MD  Internal Medicine  [facility]

## 2021-06-14 NOTE — TELEPHONE ENCOUNTER
[facility] CALLING ABOUT NEEDING ORDERS FOR TRANSFUSION     CALL:  [phone] OPTION 2    PLEASE CALL ASAP

## 2021-06-15 NOTE — TELEPHONE ENCOUNTER
Reason for Call:  Other call back      Detailed comments: PT HAD HIS 2ND COVID VACCINE ON 02/20/2021 - HAS AN APPT WITH [facility] FOR A STEROID INJECTION FOR ECZEMA ON:  03/09/2021 -  IS THIS OK    PLEASE CALL GABINO BRYAN:  [phone]  Phone Number Patient can be reached at: Other phone number:  [phone] GABINO BRYAN    Best Time: ANYTIME    Can we leave a detailed message on this number?: Yes    Call taken on 3/2/2021 at 8:58 AM by Iman Kim

## 2021-06-16 PROBLEM — F41.9 ANXIETY: Status: ACTIVE | Noted: 2021-01-06

## 2021-06-16 PROBLEM — N18.30 CHRONIC RENAL FAILURE, STAGE 3 (MODERATE) (H): Status: ACTIVE | Noted: 2021-01-20

## 2021-06-16 PROBLEM — I50.22 CHRONIC SYSTOLIC HEART FAILURE (H): Status: ACTIVE | Noted: 2018-10-04

## 2021-06-16 PROBLEM — D50.9 IRON DEFICIENCY ANEMIA: Status: ACTIVE | Noted: 2021-01-20

## 2021-06-16 NOTE — PROGRESS NOTES
"[facility] Follow Up Note    Isaac Dias   86 y.o. male    Date of Visit: 2/13/2018    Chief Complaint   Patient presents with     hemrrhoids     pt is unsure if it actually hemrrhoids, burning, getting worse for about x 2 weeks      Subjective  Isaac comes in today accompanied by his son.  He said some rectal irritation and burning for the past few weeks.  He has been taking MiraLAX as he thought it was fiber.  He has been having 2 bowel movements a day, the second 1 is usually very loose and burning.  His rectal area feels \"swollen\".    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       Medications were reconciled.  Allergies, social and family history, and the problem list were all reviewed and updated.      Exam  General Appearance: Pleasant and alert  Vitals:    02/13/18 1410   BP: 120/76   Patient Site: Left Arm   Patient Position: Sitting   Cuff Size: Adult Regular   Pulse: 68   Weight: 172 lb 14.4 oz (78.4 kg)      Body mass index is 24.81 kg/(m^2).  Wt Readings from Last 3 Encounters:   02/13/18 172 lb 14.4 oz (78.4 kg)   11/17/17 179 lb 9.6 oz (81.5 kg)   10/18/17 176 lb 6.4 oz (80 kg)     HEENT: Sclera are clear.   Lungs: Normal respirations  Rectal area: Erythema surrounding hemorrhoids are present with no stigmata of recent bleeding  Abdomen: Soft and nondistended  Extremities: No edema  Skin: No rashes  Neuro: Moves all extremities and has facial symmetry  Gait: Ambulates with a normal gait    Assessment/Plan  1. Hemorrhoid  He was started Anusol suppositories.    2. Rectal irritation  Encouraged to get calmoseptine cream to apply and to use wet flushable wipes to clean himself.    3. Acute on chronic combined systolic and diastolic congestive heart failure  He is due for labs.  - Basic " Metabolic Panel  - HM2(CBC w/o Differential)    4.  Diarrhea  Is told to stop the MiraLAX and to start on Metamucil daily.          Myriam Pascual MD  Internal and Geriatric Medicine      Much or all of the text in this note was generated through the use of Dragon Dictate voice-to-text software. Errors in spelling or words which seem out of context are unintentional. Sound alike errors, in particular, may have escaped editing.

## 2021-06-16 NOTE — TELEPHONE ENCOUNTER
Fell 1 week ago, rug burn on right elbow.  Pt was sleeping and got up quickly to answer the phone, and fell.  Last Tdap was 1/6/2012.  Pt states that he did not wash the abrasion when this occurred.  He denies any signs of infection, but states that every time he bends his elbow, it breaks open and bleeds. It is mildly painful at night.  Per protocol, pt advised that he should be evaluated today in clinic or if no available appointments, he should go to urgent care.  Pt wanted the info on the [place] urgent care clinic - given.  Pt transferred to scheduling.  Ana Maria Wilson RN 04/23/21 10:49 AM  [facility] Nurse Advisor      Reason for Disposition    Last tetanus shot > 5 years ago and DIRTY cut or scrape    Additional Information    Negative: Shock suspected (e.g., cold/pale/clammy skin, too weak to stand, low BP, rapid pulse)    Negative: Cut on the neck, chest, back, or abdomen that may go deep (e.g., stab wound or other suspicious penetrating injury)    Negative: Major bleeding (actively dripping or spurting) that can't be stopped    Negative: Amputation    Negative: Sounds like a life-threatening emergency to the triager    Negative: Animal bite and broken skin    Negative: Injury is a puncture wound    Negative: Splinter in the skin    Negative: Wound looks infected    Negative: Burn    Negative: High pressure injection injury (e.g., from paint gun, usually work-related)    Negative: Skin loss involves more than 10% of surface area (Note: the palm of the hand = 1%)    Negative: Skin is split open or gaping (length > 1/2 inch or 12 mm on the skin, 1/4 inch or 6 mm on the face)    Negative: Bleeding won't stop after 10 minutes of direct pressure (using correct technique)    Negative: Cut or scrape is very deep (e.g., can see bone or tendons)    Negative: Dirt in the wound and not removed after 15 minutes of scrubbing    Negative: Wound causes numbness (i.e., loss of sensation)    Negative: Wound  causes weakness (i.e., decreased ability to move hand, finger, toe)    Negative: Sounds like a serious injury to the triager    Negative: Looks infected (fever, spreading redness, pus, or red streak)    Negative: Raised bruise with size > 2 inches (5 cm) that is getting bigger    Negative: SEVERE pain (e.g., excruciating)    Negative: Has diabetes (diabetes mellitus) and has minor cut or scratch on foot    Negative: Patient wants to be seen    Negative: No prior tetanus shots (or is not fully vaccinated) and any wound (e.g., cut or scrape)    Negative: Suspicious history for the injury    Protocols used: SKIN INJURY-A-OH    COVID 19 Nurse Triage Plan/Patient Instructions    Please be aware that novel coronavirus (COVID-19) may be circulating in the community. If you develop symptoms such as fever, cough, or SOB or if you have concerns about the presence of another infection including coronavirus (COVID-19), please contact your health care provider or visit [email or web address].     Disposition/Instructions    In-Person Visit with provider recommended. Reference Visit Selection Guide.    Thank you for taking steps to prevent the spread of this virus.  o Limit your contact with others.  o Wear a simple mask to cover your cough.  o Wash your hands well and often.    Resources    [facility]: About COVID-19: [email or web address]    CDC: What to Do If You're Sick: www.cdc.gov/coronavirus/2019-ncov/about/steps-when-sick.html    CDC: Ending Home Isolation: www.cdc.gov/coronavirus/2019-ncov/hcp/disposition-in-home-patients.html     CDC: Caring for Someone: www.cdc.gov/coronavirus/2019-ncov/if-you-are-sick/care-for-someone.html     [facility]: Interim Guidance for Hospital Discharge to Home: www.health.[facility].mn.us/diseases/coronavirus/hcp/hospdischarge.pdf    [facility] clinical trials (COVID-19 research studies): [email or web address]/umn-clinical-trials     Below are the COVID-19 hotlines at the  [place] Department of Health ([facility]). Interpreters are available.   o For health questions: Call 417-450-8090 or 1-882.588.7757 (7 a.m. to 7 p.m.)  o For questions about schools and childcare: Call 994-821-5402 or 1-813.327.3863 (7 a.m. to 7 p.m.)

## 2021-06-17 NOTE — PROGRESS NOTES
,Isaac Dias is a 89 y.o. male who is being evaluated via a billable telephone visit.      What phone number would you like to be contacted at? [phone]  How would you like to obtain your AVS? AVS Preference: Mail a copy.    Assessment & Plan     Isaac was seen today for insomnia and depression.    Hypertension  Daughter reports low side numbers, at times below 90 systolic.      Chronic systolic heart failure  Moderately severe.  I suspect this is the cause of his sense of fatigue, lack of energy. Clinically otherwise compensated.  Perhaps Bp is too low. See below.  He requires a walker with wheels and brakes for safe ambulation.     Atherosclerosis of coronary artery without angina pectoris, unspecified vessel or lesion type, unspecified whether native or transplanted heart  Will decrease isosorbide to 15 mg po daily and follow symptoms and Bp.   -     isosorbide mononitrate (IMDUR) 30 MG 24 hr tablet; Take 0.5 tablets (15 mg total) by mouth daily.  Ambulatory referral to Adult PT- Internal    Sleep disorder  Some improvement, he agrees to trial of increasing the dose to 15 mg po at bedtime.   -     mirtazapine (REMERON) 7.5 MG tablet; Take 2 tablets (15 mg total) by mouth at bedtime.    Ashish Morley MD  [facility]    Subjective   Isaac Dias is 89 y.o. and presents today for the following health issues   HPI in follow up.  He tolerates the mirtazipine, but feels he needs higher dose.  His other symptom is a sense of lack of energy during the day for activities.  Not sleepy per se.  No fever, or unusual cough, no increased edema, or chest pain.  Mildly lightheaded at times.     Review of Systems  See HPI    Objective      Vitals:  No vitals were obtained today due to virtual visit.    Physical Exam  He sounds clear, alert, in NAD    Phone call duration: 13 minutes

## 2021-06-17 NOTE — TELEPHONE ENCOUNTER
Reason for Call: Request for an order or referral:    Order or referral being requested:   Walker - with seat, 4 wheels,brakes,    Date needed: as soon as possible    Has the patient been seen by the PCP for this problem? YES    Additional comments: please fax order to patient pharmacy on [facility]    Phone number Patient can be reached at:  Home number on file [phone] (home)    Best Time:  anytime    Can we leave a detailed message on this number?  Yes    Call taken on 5/24/2021 at 4:29 PM by Iman Kim

## 2021-06-17 NOTE — TELEPHONE ENCOUNTER
Please let her know it is both for depression and sleep.  It is ok to restart it if he has missed it.  It is OK if he misses a dose now and then. Dr. Milli MD

## 2021-06-17 NOTE — TELEPHONE ENCOUNTER
Daughter calling     She is asking if Remeron is for depression or sleep.    Her father missed two doses of Remeron at HS , and was not feeling well.   He takes it normally at .  She was advised , because of her concern with his recent behavior, to make an appointment to see MD .    She was also asking if she can give a dose now , if he didn't take it for 2 nights.   It was advised that it may make him sleepy today, and she states he has an appointment today at [facility] with his cardiologist.  She states he is also acting wound up , and she is wondering if this is because he missed those two doses.  She was sent to scheduling with Dr. Morgan.    Barbara Sidhu RN  [facility] Triage/refill nurse    Reason for Disposition    Caller has medication question only, adult not sick, and triager answers question    Protocols used: MEDICATION QUESTION CALL-A-OH

## 2021-06-17 NOTE — TELEPHONE ENCOUNTER
Spoke to Hilaria, giving her the message from Dr. Morley.  Patient does have an appointment to see Dr. Morley on 5/24/21.

## 2021-06-17 NOTE — TELEPHONE ENCOUNTER
Left message to call back for: Patient's daughter (Hilaria?)  Information to relay to patient:  Message from Dr. Morley

## 2021-06-18 ENCOUNTER — RECORDS - HEALTHEAST (OUTPATIENT)
Dept: ADMINISTRATIVE | Facility: CLINIC | Age: 86
End: 2021-06-18

## 2021-06-18 NOTE — PATIENT INSTRUCTIONS - HE
Patient Instructions by Ayanna Iglesias RN at 1/13/2021  8:00 AM     Author: Ayanna Iglesias RN Service: -- Author Type: Registered Nurse    Filed: 1/13/2021  2:44 PM Encounter Date: 1/13/2021 Status: Signed    : Ayanna Iglesias RN (Registered Nurse)       Dr. Ashish Morley -- [phone]    1st floor OP Infusion at [facility] -- [phone], option 2 for a nurse.    Call with ANY questions or concerns.        When You Need a Blood Transfusion (Adult)  A blood transfusion may be done when you have lost blood because of an injury or during surgery. It can also be done because of diseases or conditions that affect the blood. Blood is made up of several different parts (blood products). You may receive some or all of these blood products during a transfusion. Blood for transfusion is usually donated from another person (donor). Strict measures are taken to make sure that donated blood is safe before it's given to you. This sheet helps you understand how a blood transfusion is done. Your healthcare provider will discuss your condition with you and answer your questions.    The parts of blood  Blood can be broken down into different parts that perform special roles in the body. These parts include:    Red blood cells, which carry oxygen throughout the body.    Platelets, which help stop bleeding.    Plasma (the liquid part of blood), which carries red blood cells and platelets throughout the body. Plasma also helps platelets in stopping bleeding.  Where does donated blood come from?    Volunteer donors. These are people who donate their blood to help others in need of blood. Blood donation can take place at several places, including a hospital, blood bank, or during a blood drive.    Directed donation. If you need a blood transfusion during a planned surgery, family and friends can have their blood tested for compatibility and donate blood for you before the surgery. This needs to be done at least 7 day(s) in  advance. This is because the blood must be tested for safety.    Autologous donation. This is also called self-donation. For planned surgery, you can donate your own blood starting up to 6 weeks before surgery.  Are blood transfusions safe?  Donated blood is tested and processed to make sure that the blood is safe:    The health and medical history of each donor is carefully screened. If a person is considered high-risk for infection or problems, he or she isn't accepted as a blood donor.    Donated blood is tested for infections such as hepatitis, syphilis, and HIV (the virus that causes AIDS). If the tested blood is found to be unsafe, it's destroyed.    Blood is divided into four types: A, B, AB, and O. Blood also has Rh types: positive (+) and negative (-). You can only receive blood products that are compatible with (match) your blood type. A sample of your blood is tested for compatibility with donated blood. This is done before blood products are prepared for a transfusion.  How is a blood transfusion done?  A blood transfusion takes place in a blood center, infusion center, hospital room, or operating room. Your healthcare provider will discuss the blood transfusion with you before it's done. You'll need to give permission for the blood transfusion by signing a consent form.    Two healthcare providers confirm your identity. They also confirm that they have the correct blood product(s) for you.    An intravenous (IV) line is placed in a vein if you do not already have an IV.    The blood product comes in a plastic bag that is hung on an IV pole. The blood product flows from the bag into your IV line. The IV line may be connected to a pump, which controls the transfusion rate. You may receive more than one kind of blood product through the IV.    Your vital signs (blood pressure, heart rate, respiratory rate, and temperature) are checked throughout the transfusion. This is to make sure you are not having a  reaction to the blood product.    The IV line may be removed once the transfusion is complete.  Possible risks and complications of blood transfusions  Most transfusions are problem free. In some cases, reactions occur. These can happen within seconds to minutes during the transfusion or a week to a few months after the transfusion. Call your doctor or nurse right away if you have any of the signs or symptoms in the table below during or after a transfusion:  Reaction Timing Symptoms   Allergic reaction (mild)   Within seconds to minutes during the transfusion    Up to 24 hours after the transfusion Hives or red welts on the skin, mild itching, rash, localized swelling, flushing (red face), wheezing, shortness of breath, or stridor (high-pitched noise or sound)   Anaphylactic reaction   Within seconds to minutes during the transfusion    Up to 24 hours after the transfusion Shortness of breath, flushing (red face), wheezing, labored (working hard) breathing, low blood pressure, localized swelling, chest tightness, or cramps   Febrile nonhemolytic reaction   Within minutes to hours during the transfusion    Within a few hours to 24 hours after the transfusion Fever (increase of 1  C or higher), chills, flushing (red face), nausea, headache, minor discomfort, or mild shortness of breath   Acute immune hemolytic reaction   Within minutes during the transfusion    Up to 24 hours after the transfusion Fever, red or brown urine, back pain, fast heart rate (tachycardia), abdominal pain, low blood pressure, feeling anxious, chills, chest pain, nausea, or fainting spells   Transfusion-related acute lung injury (TRALI)   Within 1 to 2 hours during the transfusion    Up to 6 hours after the transfusion Shortness of breath, trouble breathing, low blood pressure, fever, pulmonary edema   Transfusion-associated circulatory overload   Near the end of the transfusion    Within 6 hours after the transfusion Shortness of breath, fast  "heart rate (tachycardia), problems breathing when lying on back, abnormal blood pressure   Post-transfusion purpura (PUP)   Within 1 week    Up to 48 days after the transfusion Purple spots on skin; nose bleed; bleeding from the urinary tract, abdomen, colon, or rectum; fever; or chills         \"Delayed\" transfusion-related acute lung injury (TRALI)   Within 72 hours (3 days) after the transfusion \"Sudden\" onset of respiratory distress or trouble breathing   \"Delayed\" hemolytic reaction   Within 3 to 7 days    Up to weeks after the transfusion Low-grade fever, mild jaundice (yellowing of the skin and whites of the eyes), decrease in hematocrit, chills, chest pain, back pain, nausea   Date Last Reviewed: 12/1/2016 2000-2017 [employer]. [address] [place] [address], [place] [address]. All rights reserved. This information is not intended as a substitute for professional medical care. Always follow your healthcare professional's instructions.                "

## 2021-06-18 NOTE — PATIENT INSTRUCTIONS - HE
Patient Instructions by Ibis Hill CNP at 4/23/2021 12:15 PM     Author: Ibis Hill CNP Service: -- Author Type: Nurse Practitioner    Filed: 4/23/2021  2:00 PM Encounter Date: 4/23/2021 Status: Signed    : Ibis Hill CNP (Nurse Practitioner)         Patient Education     Abrasions  Abrasions are skin scrapes. Their treatment depends on how large and deep the abrasion is.  Home care  You may be prescribed an antibiotic cream or ointment to apply to the wound. This helps prevent infection. Follow instructions when using this medicine.  General care    To care for the abrasion, do the following each day for as long as directed by your healthcare provider.  ? If you were given a bandage, change it once a day. If your bandage sticks to the wound, soak it in warm water until it loosens.  ? Wash the area with soap and warm water. You may do this in a sink or under a tub faucet or shower. Rinse off the soap. Then pat the area dry with a clean towel.  ? If antibiotic ointment or cream was prescribed, reapply it to the wound as directed. Cover the wound with a fresh nonstick bandage. If the bandage becomes wet or dirty, change it as soon as possible.  ? Some antibiotic ointments or cream can cause an allergic reaction or dermatitis. This may cause redness, itching and or hives. If this occurs, stop using the ointment immediately and wash off any remaining ointment. You may need to take some allergy medicine to relieve symptoms.    You may use acetaminophen or ibuprofen to control pain unless another pain medicine was prescribed. Talk with your healthcare provider before using these medicines if you have chronic liver or kidney disease or ever had a stomach ulcer or GI bleeding. Dont use ibuprofen in children younger than six months old.    Most skin wounds heal within 10 days. But an infection may occur even with treatment. So its important to watch the wound for signs of infection as listed  below.  Follow-up care  Follow up with your healthcare provider, or as advised.  When to seek medical advice  Call your healthcare provider right away if any of these occur:    Fever of 100.4 F (38 C) or higher, or as directed by your healthcare provider    Increasing pain, redness, swelling, or drainage from the wound    Bleeding from the wound that does not stop after a few minutes of steady, firm pressure    Decreased ability to move any body part near the wound  Date Last Reviewed: 3/3/2017    [phone] [employer]. [address] [place], [place] [id number]. All rights reserved. This information is not intended as a substitute for professional medical care. Always follow your healthcare professional's instructions.

## 2021-06-19 NOTE — LETTER
Letter by Ashish Morley MD at      Author: Ashish Morley MD Service: -- Author Type: --    Filed:  Encounter Date: 5/8/2019 Status: (Other)         Isaac Dias  [address] [facility] [address]     May 8, 2019     Dear Mr. Dias,    Below are the results from your recent visit:    Resulted Orders   Iron and Transferrin Iron Binding Capacity   Result Value Ref Range    Iron 78 42 - 175 ug/dL    Transferrin 329 212 - 360 mg/dL    Transferrin Saturation, Calculated 19 (L) 20 - 50 %    Transferrin IBC, Calculated 412 313 - 563 ug/dL   Comprehensive Metabolic Panel   Result Value Ref Range    Sodium 139 136 - 145 mmol/L    Potassium 4.5 3.5 - 5.0 mmol/L    Chloride 103 98 - 107 mmol/L    CO2 24 22 - 31 mmol/L    Anion Gap, Calculation 12 5 - 18 mmol/L    Glucose 100 70 - 125 mg/dL    BUN 33 (H) 8 - 28 mg/dL    Creatinine 1.65 (H) 0.70 - 1.30 mg/dL    GFR MDRD Af Amer 48 (L) >60 mL/min/1.73m2    GFR MDRD Non Af Amer 40 (L) >60 mL/min/1.73m2    Bilirubin, Total 1.6 (H) 0.0 - 1.0 mg/dL    Calcium 10.2 8.5 - 10.5 mg/dL    Protein, Total 7.6 6.0 - 8.0 g/dL    Albumin 4.5 3.5 - 5.0 g/dL    Alkaline Phosphatase 81 45 - 120 U/L    AST 21 0 - 40 U/L    ALT 16 0 - 45 U/L    Narrative    Fasting Glucose reference range is 70-99 mg/dL per  American Diabetes Association (ADA) guidelines.   Thyroid Stimulating Hormone (TSH)   Result Value Ref Range    TSH 4.19 0.30 - 5.00 uIU/mL   T4, Free   Result Value Ref Range    Free T4 0.9 0.7 - 1.8 ng/dL   Ferritin   Result Value Ref Range    Ferritin 68 27 - 300 ng/mL   HM1 (CBC with Diff)   Result Value Ref Range    WBC 8.2 4.0 - 11.0 thou/uL    RBC 3.96 (L) 4.40 - 6.20 mill/uL    Hemoglobin 13.0 (L) 14.0 - 18.0 g/dL    Hematocrit 38.5 (L) 40.0 - 54.0 %    MCV 97 80 - 100 fL    MCH 32.9 27.0 - 34.0 pg    MCHC 33.9 32.0 - 36.0 g/dL    RDW 11.6 11.0 - 14.5 %    Platelets 143 140 - 440 thou/uL    MPV 7.3 7.0 - 10.0 fL    Neutrophils % 50 50 - 70 %    Lymphocytes % 23 20 - 40 %     Monocytes % 13 (H) 2 - 10 %    Eosinophils % 14 (H) 0 - 6 %    Basophils % 1 0 - 2 %    Neutrophils Absolute 4.1 2.0 - 7.7 thou/uL    Lymphocytes Absolute 1.9 0.8 - 4.4 thou/uL    Monocytes Absolute 1.1 (H) 0.0 - 0.9 thou/uL    Eosinophils Absolute 1.1 (H) 0.0 - 0.4 thou/uL    Basophils Absolute 0.1 0.0 - 0.2 thou/uL       Your tests are all satisfactory.  The iron levels are OK, the other mild abnormalities are not significant. The creatinine is the kidney test and that is stable, and not worsening.      Please call with questions or contact us using [employer].    Sincerely,        Electronically signed by Ashish Morley MD

## 2021-06-20 NOTE — PROGRESS NOTES
ASSESSMENT AND PLAN:    1. Chronic systolic heart failure (H)  He is compensated, tolerating activity taking care of his lawn and house.  No clinical need for change in medications.  Continue as present.  Recent echocardiogram is reviewed.  Severe ischemic cardiomyopathy.  ICD in place without firing.     2. Neck lumps  Bilateral 'lumps' just posterior to his sternocleidomastoid muscles, not tender. They are pulsatile and consistent with normal pulsatile neck circulation made more prominent by his age.  Reassured.      3.weight loss  This is mild, likely related to severe cardiomyopathy and age.  No clinical concern at this time, unless this progresses.      4. Hypertension  Controlled with current medication    5. Flu vaccine need  - Influenza High Dose, Seasonal    6. Atrial fibrillation, unspecified type (H)  HR controlled.      CHIEF COMPLAINT:  Chief Complaint   Patient presents with     Neck Injury     x 1 month- left side     HISTORY OF PRESENT ILLNESS:  Isaac Dias is a 87 y.o. male with concern about some 'lumps' on his neck.  Not painful, just noticed by him.  He reports sleeping well without orthopnea, or PND.  Tolerating activity taking care of his lawn and in the house without dizziness or undue dyspnea.  Appetite is down some, lost 10 pounds in the past year.  Does eat and does not feel hungry.  No fever, or chills, or unusual cough.     REVIEW OF SYSTEMS:   See HPI, all other pertinent systems on review are negative.    Active Ambulatory Problems     Diagnosis Date Noted     Pacemaker Placement      Hyperlipidemia      Hypertension      Colonic Angiodysplasia      Coronary Artery Disease      Rheumatoid Arthritis      Spinal Stenosis      Polymyalgia Rheumatica      Ventricular Tachycardia      Xerosis Cutis      Chronic systolic heart failure (H) 10/04/2018     Resolved Ambulatory Problems     Diagnosis Date Noted     Atrial fibrillation (H)      Difficulty Breathing (Dyspnea)      Congestive  Heart Failure      Anemia      Acute myocardial infarction (H)      Acute Sinusitis      Past Medical History:   Diagnosis Date     Acute myocardial infarction (H)      Atrial fibrillation (H)      Chronic systolic heart failure (H) 10/4/2018     Coronary artery disease      Past Surgical History:   Procedure Laterality Date     CARDIAC ELECTROPHYSIOLOGY MAPPING AND ABLATION       CARDIAC PACEMAKER PLACEMENT      ICD combined     CERVICAL DISCECTOMY       VITALS:  Vitals:    10/04/18 1312   BP: 118/60   Patient Site: Left Arm   Patient Position: Sitting   Cuff Size: Adult Regular   Pulse: 75   SpO2: 97%   Weight: 167 lb 9.6 oz (76 kg)     Wt Readings from Last 3 Encounters:   10/04/18 167 lb 9.6 oz (76 kg)   02/13/18 172 lb 14.4 oz (78.4 kg)   11/17/17 179 lb 9.6 oz (81.5 kg)     PHYSICAL EXAM:  Constitutional:  Well appearing in NAD, alert and oriented  Neck:  Supple, no significant adenopathy, bilateral soft, pulsatile neck artery on each side, just posterior to the sternocleidomastoid muscles.  Neck has good ROM for age.  Cardiac:  S1 S2 with soft, systolic murmur  Lungs: no wheezes or rhonchi  Abdomen:   Soft, flat and non-tender, without  guarding, rebound, or mass.    Extremities: Joints without effusion or inflammation, distal pulses diminished, mild edema    DECISION TO OBTAIN OLD RECORDS AND/OR OBTAIN HISTORY FROM SOMEONE OTHER THAN PATIENT, AND/OR ACCESSING CARE EVERYWHERE):  1  0     REVIEW AND SUMMARIZATION OF OLD RECORDS, AND/OR OBTAINING HISTORY FROM SOMEONE OTHER THAN PATIENT, AND/OR DISCUSSION OF CASE WITH ANOTHER HEALTH CARE PROVIDER:  2 0    REVIEW AND/OR ORDER OF OF CLINICAL LAB TESTS: 1  0.    REVIEW AND/OR ORDER OF RADIOLOGY TESTS: 1 0.    REVIEW AND/OR ORDER OF MEDICAL TESTS (EKG/ECHO/COLONOSCOPY/EGD): 1  Reviewed echocardiogram results    INDEPENDENT  VISUALIZATION OF IMAGE, TRACING, OR SPECIMEN ITSELF (2 EACH):  0     TOTAL: 1    Current Outpatient Prescriptions   Medication Sig Dispense  Refill     aspirin 81 MG EC tablet Take 81 mg by mouth daily.       carvedilol (COREG) 6.25 MG tablet Take 2 tablets (12.5 mg total) by mouth 2 (two) times a day with meals. 360 tablet 3     cholecalciferol, vitamin D3, (VITAMIN D3) 1,000 unit capsule Take 1 capsule (1,000 Units total) by mouth daily. 90 capsule 3     docusate sodium (COLACE) 100 MG capsule Take 1 capsule (100 mg total) by mouth 2 (two) times a day. 30 capsule 3     ferrous sulfate 325 (65 FE) MG tablet TAKE 1 TABLET BY MOUTH TWICE DAILY WITH MEALS 180 tablet 3     furosemide (LASIX) 40 MG tablet 80 mg in AM and 40 in  tablet 2     hydrocortisone 25 mg suppository Insert 1 suppository (25 mg total) into the rectum 2 (two) times a day as needed for hemorrhoids. 24 suppository 12     isosorbide mononitrate (IMDUR) 60 MG 24 hr tablet Take 1 tablet (60 mg total) by mouth daily. 90 tablet 0     loratadine (CLARITIN) 10 mg tablet Take 1 tablet (10 mg total) by mouth daily. 90 tablet 3     losartan (COZAAR) 50 MG tablet Take 1 tablet (50 mg total) by mouth daily. 90 tablet 3     nitroglycerin (NITROSTAT) 0.4 MG SL tablet Place 1 tablet (0.4 mg total) under the tongue every 5 (five) minutes as needed for chest pain. 30 tablet prn     peg 400-propylene glycol (SYSTANE) 0.4-0.3 % Drop Administer 1 drop to both eyes 4 (four) times a day as needed. 20 mL 3     potassium chloride (MICRO-K) 10 mEq CR capsule Take 1 capsule (10 mEq total) by mouth daily. 90 capsule 3     simvastatin (ZOCOR) 20 MG tablet Take 1 tablet (20 mg total) by mouth at bedtime. 90 tablet 0     spironolactone (ALDACTONE) 25 MG tablet Take 1 tablet (25 mg total) by mouth daily. 90 tablet 3     triamcinolone (KENALOG) 0.1 % cream Apply topically 2 (two) times a day.    **[facility] @ [phone]** 30 g 2     No current facility-administered medications for this visit.      Ashish Morley MD  Internal Medicine  [facility]

## 2021-06-20 NOTE — LETTER
Letter by Ashish Morley MD at      Author: Ashish Morley MD Service: -- Author Type: --    Filed:  Encounter Date: 9/28/2020 Status: (Other)         Isaac Dias  [address] [facility] [address]     September 28, 2020     Dear Mr. Dias,    Below are the results from your recent visit:    Resulted Orders   HM2(CBC w/o Differential)   Result Value Ref Range    WBC 7.8 4.0 - 11.0 thou/uL    RBC 3.41 (L) 4.40 - 6.20 mill/uL    Hemoglobin 9.6 (L) 14.0 - 18.0 g/dL    Hematocrit 29.5 (L) 40.0 - 54.0 %    MCV 87 80 - 100 fL    MCH 28.3 27.0 - 34.0 pg    MCHC 32.6 32.0 - 36.0 g/dL    RDW 14.2 11.0 - 14.5 %    Platelets 213 140 - 440 thou/uL    MPV 7.4 7.0 - 10.0 fL   Comprehensive Metabolic Panel   Result Value Ref Range    Sodium 138 mmol/L    Potassium 4.1 mmol/L    Chloride 106 mmol/L    CO2 22 mmol/L    Anion Gap, Calculation 10 mmol/L    Glucose 118 mg/dL    BUN 42 (H) mg/dL    Creatinine 1.71 (H) mg/dL    GFR MDRD Af Amer 46 (L) mL/min/1.73m2    GFR MDRD Non Af Amer 38 (L) mL/min/1.73m2    Bilirubin, Total 0.8 mg/dL    Calcium 9.3 mg/dL    Protein, Total 7.1 g/dL    Albumin 4.0 g/dL    Alkaline Phosphatase 83 U/L    AST 17 U/L    ALT 18 U/L   Iron and Transferrin Iron Binding Capacity   Result Value Ref Range    Iron 39 (L) ug/dL    Transferrin 308 mg/dL    Transferrin Saturation, Calculated 10 (L) %    Transferrin IBC, Calculated 385 ug/dL   Ferritin   Result Value Ref Range    Ferritin 37 ng/mL   Vitamin B12   Result Value Ref Range    Vitamin B-12 305 pg/mL       Your tests OK, but the iron level is dropping slowly as is the blood count.  We should re-check this in 2 months.      Please call with questions or contact us using [email or web address].    Sincerely,        Electronically signed by Ashish Morley MD

## 2021-06-21 NOTE — LETTER
Letter by Vel Best MD at      Author: Vel Best MD Service: -- Author Type: --    Filed:  Encounter Date: 3/19/2021 Status: (Other)         Isaac Dias  [address] [facility] [address]      March 19, 2021      Dear Isaac,    This letter is to remind you that you are due for your follow up appointment with Ladi Read CNP in January, 2021 . To help ensure you are in the best health possible, a regular follow-up with your cardiologist is essential.     Please call our Patient Scheduling Line at [phone] to schedule your appointment at your earliest convenience.  If you have recently scheduled an appointment, please disregard this letter.    We look forward to seeing you again. As always, we are available at the number  above for any questions or concerns you may have.      Sincerely,     The Physicians and Staff of [facility]

## 2021-06-23 NOTE — TELEPHONE ENCOUNTER
RN cannot approve Refill Request    RN can NOT refill this medication former pt of Dr. Pascual . Last office visit: 3/23/2017 Chaparro Jackson MD Last Physical: Visit date not found Last MTM visit: Visit date not found Last visit same specialty: 10/4/2018 Ashish Morley MD.  Next visit within 3 mo: Visit date not found  Next physical within 3 mo: Visit date not found      Luis Rnener, [facility] Connection Triage/Med Refill 2/9/2019    Requested Prescriptions   Pending Prescriptions Disp Refills     spironolactone (ALDACTONE) 25 MG tablet [Pharmacy Med Name: SPIRONOLACTONE 25MG TABLETS] 90 tablet 2     Sig: TAKE 1 TABLET(25 MG) BY MOUTH DAILY    Diuretics/Combination Diuretics Refill Protocol  Passed - 2/6/2019  6:52 PM       Passed - Visit with PCP or prescribing provider visit in past 12 months    Last office visit with prescriber/PCP: 3/23/2017 Chaparro Jackson MD OR same dept: 10/4/2018 Ashish Morley MD OR same specialty: 10/4/2018 Ashish Morley MD  Last physical: Visit date not found Last MTM visit: Visit date not found   Next visit within 3 mo: Visit date not found  Next physical within 3 mo: Visit date not found  Prescriber OR PCP: Chaparro Jackson MD  Last diagnosis associated with med order: 1. Hyperlipidemia  - simvastatin (ZOCOR) 20 MG tablet [Pharmacy Med Name: SIMVASTATIN 20MG TABLETS]; TAKE 1 TABLET(20 MG) BY MOUTH AT BEDTIME  Dispense: 90 tablet; Refill: 2    2. HTN (hypertension)  - spironolactone (ALDACTONE) 25 MG tablet [Pharmacy Med Name: SPIRONOLACTONE 25MG TABLETS]; TAKE 1 TABLET(25 MG) BY MOUTH DAILY  Dispense: 90 tablet; Refill: 2  - isosorbide mononitrate (IMDUR) 60 MG 24 hr tablet [Pharmacy Med Name: ISOSORBIDE MONONITRATE 60MG ER TABS]; TAKE 1 TABLET(60 MG) BY MOUTH DAILY  Dispense: 90 tablet; Refill: 1    3. Hypokalemia  - potassium chloride (MICRO-K) 10 mEq CR capsule [Pharmacy Med Name: POTASSIUM CL 10MEQ ER CAPSULES]; TAKE 1 CAPSULE(10 MEQ) BY MOUTH DAILY  Dispense: 90 capsule;  Refill: 2    If protocol passes may refill for 12 months if within 3 months of last provider visit (or a total of 15 months).            Passed - Serum Potassium in past 12 months     Lab Results   Component Value Date    Potassium 4.4 02/13/2018            Passed - Serum Sodium in past 12 months     Lab Results   Component Value Date    Sodium 139 02/13/2018            Passed - Blood pressure on file in past 12 months    BP Readings from Last 1 Encounters:   10/04/18 118/60            Passed - Serum Creatinine in past 12 months     Creatinine   Date Value Ref Range Status   02/13/2018 1.55 (H) 0.70 - 1.30 mg/dL Final             potassium chloride (MICRO-K) 10 mEq CR capsule [Pharmacy Med Name: POTASSIUM CL 10MEQ ER CAPSULES] 90 capsule 2     Sig: TAKE 1 CAPSULE(10 MEQ) BY MOUTH DAILY    Potassium Supplements Refill Protocol Passed - 2/6/2019  6:52 PM       Passed - PCP or prescribing provider visit in past 12 months      Last office visit with prescriber/PCP: 3/23/2017 Chaparro Jackson MD OR same dept: 10/4/2018 Ashish Morley MD OR same specialty: 10/4/2018 Ashish Morley MD  Last physical: Visit date not found Last MTM visit: Visit date not found   Next visit within 3 mo: Visit date not found  Next physical within 3 mo: Visit date not found  Prescriber OR PCP: Chaparro Jackson MD  Last diagnosis associated with med order: 1. Hyperlipidemia  - simvastatin (ZOCOR) 20 MG tablet [Pharmacy Med Name: SIMVASTATIN 20MG TABLETS]; TAKE 1 TABLET(20 MG) BY MOUTH AT BEDTIME  Dispense: 90 tablet; Refill: 2    2. HTN (hypertension)  - spironolactone (ALDACTONE) 25 MG tablet [Pharmacy Med Name: SPIRONOLACTONE 25MG TABLETS]; TAKE 1 TABLET(25 MG) BY MOUTH DAILY  Dispense: 90 tablet; Refill: 2  - isosorbide mononitrate (IMDUR) 60 MG 24 hr tablet [Pharmacy Med Name: ISOSORBIDE MONONITRATE 60MG ER TABS]; TAKE 1 TABLET(60 MG) BY MOUTH DAILY  Dispense: 90 tablet; Refill: 1    3. Hypokalemia  - potassium chloride (MICRO-K) 10 mEq CR  capsule [Pharmacy Med Name: POTASSIUM CL 10MEQ ER CAPSULES]; TAKE 1 CAPSULE(10 MEQ) BY MOUTH DAILY  Dispense: 90 capsule; Refill: 2    If protocol passes may refill for 12 months if within 3 months of last provider visit (or a total of 15 months).            Passed - Potassium level in last 12 months    Lab Results   Component Value Date    Potassium 4.4 02/13/2018             simvastatin (ZOCOR) 20 MG tablet [Pharmacy Med Name: SIMVASTATIN 20MG TABLETS] 90 tablet 2     Sig: TAKE 1 TABLET(20 MG) BY MOUTH AT BEDTIME    Statins Refill Protocol (Hmg CoA Reductase Inhibitors) Passed - 2/6/2019  6:52 PM       Passed - PCP or prescribing provider visit in past 12 months     Last office visit with prescriber/PCP: 3/23/2017 Chaparro Jackson MD OR same dept: 10/4/2018 Ashish Morley MD OR same specialty: 10/4/2018 Ashish Morley MD  Last physical: Visit date not found Last MTM visit: Visit date not found   Next visit within 3 mo: Visit date not found  Next physical within 3 mo: Visit date not found  Prescriber OR PCP: Chaparro Jackson MD  Last diagnosis associated with med order: 1. Hyperlipidemia  - simvastatin (ZOCOR) 20 MG tablet [Pharmacy Med Name: SIMVASTATIN 20MG TABLETS]; TAKE 1 TABLET(20 MG) BY MOUTH AT BEDTIME  Dispense: 90 tablet; Refill: 2    2. HTN (hypertension)  - spironolactone (ALDACTONE) 25 MG tablet [Pharmacy Med Name: SPIRONOLACTONE 25MG TABLETS]; TAKE 1 TABLET(25 MG) BY MOUTH DAILY  Dispense: 90 tablet; Refill: 2  - isosorbide mononitrate (IMDUR) 60 MG 24 hr tablet [Pharmacy Med Name: ISOSORBIDE MONONITRATE 60MG ER TABS]; TAKE 1 TABLET(60 MG) BY MOUTH DAILY  Dispense: 90 tablet; Refill: 1    3. Hypokalemia  - potassium chloride (MICRO-K) 10 mEq CR capsule [Pharmacy Med Name: POTASSIUM CL 10MEQ ER CAPSULES]; TAKE 1 CAPSULE(10 MEQ) BY MOUTH DAILY  Dispense: 90 capsule; Refill: 2    If protocol passes may refill for 12 months if within 3 months of last provider visit (or a total of 15 months).              isosorbide mononitrate (IMDUR) 60 MG 24 hr tablet [Pharmacy Med Name: ISOSORBIDE MONONITRATE 60MG ER TABS] 90 tablet 1     Sig: TAKE 1 TABLET(60 MG) BY MOUTH DAILY    Isosorbide Refill Protocol Passed - 2/6/2019  6:52 PM       Passed - Visit with PCP or prescribing provider visit in last 6 months or next 3 months    Last office visit with prescriber/PCP: Visit date not found OR same dept: 10/4/2018 Ashish Morley MD OR same specialty: 10/4/2018 Ashish Morley MD Last physical: Visit date not found Last MTM visit: Visit date not found     Next appt within 3 mo: Visit date not found  Next physical within 3 mo: Visit date not found  Prescriber OR PCP: Chaparro Jackson MD  Last diagnosis associated with med order: 1. Hyperlipidemia  - simvastatin (ZOCOR) 20 MG tablet [Pharmacy Med Name: SIMVASTATIN 20MG TABLETS]; TAKE 1 TABLET(20 MG) BY MOUTH AT BEDTIME  Dispense: 90 tablet; Refill: 2    2. HTN (hypertension)  - spironolactone (ALDACTONE) 25 MG tablet [Pharmacy Med Name: SPIRONOLACTONE 25MG TABLETS]; TAKE 1 TABLET(25 MG) BY MOUTH DAILY  Dispense: 90 tablet; Refill: 2  - isosorbide mononitrate (IMDUR) 60 MG 24 hr tablet [Pharmacy Med Name: ISOSORBIDE MONONITRATE 60MG ER TABS]; TAKE 1 TABLET(60 MG) BY MOUTH DAILY  Dispense: 90 tablet; Refill: 1    3. Hypokalemia  - potassium chloride (MICRO-K) 10 mEq CR capsule [Pharmacy Med Name: POTASSIUM CL 10MEQ ER CAPSULES]; TAKE 1 CAPSULE(10 MEQ) BY MOUTH DAILY  Dispense: 90 capsule; Refill: 2    If protocol passes may refill for 6 months if within 3 months of last provider visit (or a total of 9 months).             Passed - Blood pressure filed in past 12 months    BP Readings from Last 1 Encounters:   10/04/18 118/60

## 2021-06-24 NOTE — TELEPHONE ENCOUNTER
RN cannot approve Refill Request    RN can NOT refill this medication overdue for office visits and/or labs.    Felice Peters, [facility] Triage/Med Refill 3/12/2019    Requested Prescriptions   Pending Prescriptions Disp Refills     losartan (COZAAR) 50 MG tablet 90 tablet 0     Sig: Take 1 tablet (50 mg total) by mouth daily.    Angiotensin Receptor Blocker Protocol Failed - 3/12/2019  2:03 PM       Failed - Serum potassium within the past 12 months    No results found for: LN-POTASSIUM         Failed - Serum creatinine within the past 12 months    Creatinine   Date Value Ref Range Status   02/13/2018 1.55 (H) 0.70 - 1.30 mg/dL Final            Passed - PCP or prescribing provider visit in past 12 months      Last office visit with prescriber/PCP: 2/13/2018 Myriam Pascual MD OR same dept: 10/4/2018 Ashish Morley MD OR same specialty: 10/4/2018 Ashish Morley MD  Last physical: 9/21/2016 Last MTM visit: Visit date not found   Next visit within 3 mo: Visit date not found  Next physical within 3 mo: Visit date not found  Prescriber OR PCP: Myriam Pascual MD  Last diagnosis associated with med order: There are no diagnoses linked to this encounter.  If protocol passes may refill for 12 months if within 3 months of last provider visit (or a total of 15 months).            Passed - Blood pressure filed in past 12 months    BP Readings from Last 1 Encounters:   10/04/18 118/60

## 2021-06-30 NOTE — PROGRESS NOTES
Progress Notes by Marc Fish at 4/23/2021 12:15 PM     Author: Marc Fish Service: -- Author Type: Medical Student    Filed: 4/23/2021  4:00 PM Encounter Date: 4/23/2021 Status: Attested    : Marc Fish (Medical Student)    Related Notes: Original Note by Marc Fish (Medical Student) filed at 4/23/2021  3:24 PM    Cosigner: Ibis Hill CNP at 4/23/2021  4:01 PM    Attestation signed by Ibis Hill CNP at 4/23/2021  4:01 PM    Preceptor attestation:   Patient was seen in conjunction with Marc Fish RN, student NP. Joint medical decision making was used.    I discussed the patient's history and physical exam with the student and key elements of the physical exam were performed by myself. I agree with their assessment and plan and above documentation.     STEVE Rodriguez CNP                   Chief Complaint   Patient presents with   ? Arm Injury     Fell 1 week ago, wound on the elbow, not oozing or hot       HPI: Isaac Dias is a 89 y.o. male who presents today accompanied by daughter for concern of right elbow wound. States fell at home a week ago when he got up to answer his telephone and sustained a rug burn to right elbow. Applied 4x4 gauze with tape, no drainage noted.Denies any pain. Daughter concerned about infection and care for wound. Denies hitting his head or loss of consciousness at time of fall. Range of motion to affected extremity and all joints effortless and painless.  Denies any fevers or chills.     History obtained from the patient.    Problem List:  2021-01: Iron deficiency anemia  2021-01: Chronic renal failure, stage 3 (moderate)  2021-01: Acute on chronic systolic heart failure (H)  2021-01: Acute combined systolic and diastolic CHF, NYHA class 4 (H)  2021-01: Anxiety  2018-10: Chronic systolic heart failure (H)  2014-01: ACP (advance care planning)  Pacemaker Placement  Atrial fibrillation (H)  Hyperlipidemia  Hypertension  Difficulty Breathing  (Dyspnea)  Congestive Heart Failure  Anemia  Colonic Angiodysplasia  Coronary Artery Disease  Acute myocardial infarction (H)  Rheumatoid arthritis (H)  Spinal Stenosis  Polymyalgia Rheumatica  Ventricular Tachycardia  Acute Sinusitis  Xerosis cutis  Anemia      Past Medical History:   Diagnosis Date   ? Anemia    ? Angiodysplasia of colon    ? Anxiety 1/6/2021   ? Atrial fibrillation (H)     Resolved after ablation    ? Chronic renal failure, stage 3 (moderate) 1/20/2021   ? Chronic systolic heart failure (H) 10/4/2018   ? Coronary artery disease    ? History of MI (myocardial infarction)    ? History of polymyalgia rheumatica    ? History of ventricular tachycardia    ? Hyperlipidemia    ? Hypertension    ? Iron deficiency anemia 1/20/2021   ? Rheumatoid arthritis (H)    ? Status cardiac pacemaker        Social History     Tobacco Use   ? Smoking status: Never Smoker   ? Smokeless tobacco: Never Used   Substance Use Topics   ? Alcohol use: No       Review of Systems   Constitutional: Negative for activity change, fatigue and fever.   Respiratory: Negative for shortness of breath and wheezing.    Cardiovascular: Negative for chest pain and palpitations.   Gastrointestinal: Negative for abdominal distention, abdominal pain, blood in stool, nausea and vomiting.   Genitourinary: Negative for difficulty urinating and dysuria.   Musculoskeletal: Positive for gait problem. Negative for arthralgias, back pain and neck pain.   Neurological: Negative for tremors, seizures, weakness, light-headedness, numbness and headaches.       Vitals:    04/23/21 1240   BP: 105/67   Patient Site: Left Arm   Patient Position: Sitting   Cuff Size: Adult Regular   Pulse: 67   Resp: 24   Temp: 97.6  F (36.4  C)   TempSrc: Oral   SpO2: 99%   Weight: 148 lb 5 oz (67.3 kg)       Physical Exam  Constitutional:       General: He is not in acute distress.     Appearance: Normal appearance. He is normal weight. He is not ill-appearing.   HENT:       Head: Normocephalic and atraumatic.      Mouth/Throat:      Mouth: Mucous membranes are moist.   Eyes:      Extraocular Movements: Extraocular movements intact.      Pupils: Pupils are equal, round, and reactive to light.   Neck:      Musculoskeletal: Normal range of motion.   Cardiovascular:      Rate and Rhythm: Normal rate and regular rhythm.      Pulses: Normal pulses.      Heart sounds: Normal heart sounds. No murmur. No friction rub. No gallop.    Pulmonary:      Effort: Pulmonary effort is normal. No respiratory distress.      Breath sounds: Normal breath sounds. No wheezing.   Abdominal:      General: Abdomen is flat. Bowel sounds are normal.      Palpations: Abdomen is soft.   Musculoskeletal: Normal range of motion.         General: No tenderness.   Skin:     Findings: Bruising and erythema present.      Comments: Abrasion to right elbow, with bruising around the area    Neurological:      Mental Status: He is alert and oriented to person, place, and time.   Psychiatric:         Behavior: Behavior normal.         No notes on file    Labs:N/A     Radiology: N/A     Differential Diagnoses: Cellulitis, skin tear/abrasion, soft skin infection    Clinical Decision Makin. Infected abrasion  mupirocin (BACTROBAN) 2 % ointment    Discussed with patient and daughter use of mupirocin, discussed care of wound and need to keep it clean.  Discussed return to be seen if worsening or increased fever above 101.3*Fahreinheit  Patient and daughter understood all instructions agreed to plan.   .Marc Fish,RN, NP Student        Patient Instructions     Patient Education     Abrasions  Abrasions are skin scrapes. Their treatment depends on how large and deep the abrasion is.  Home care  You may be prescribed an antibiotic cream or ointment to apply to the wound. This helps prevent infection. Follow instructions when using this medicine.  General care    To care for the abrasion, do the following each day for as  long as directed by your healthcare provider.  ? If you were given a bandage, change it once a day. If your bandage sticks to the wound, soak it in warm water until it loosens.  ? Wash the area with soap and warm water. You may do this in a sink or under a tub faucet or shower. Rinse off the soap. Then pat the area dry with a clean towel.  ? If antibiotic ointment or cream was prescribed, reapply it to the wound as directed. Cover the wound with a fresh nonstick bandage. If the bandage becomes wet or dirty, change it as soon as possible.  ? Some antibiotic ointments or cream can cause an allergic reaction or dermatitis. This may cause redness, itching and or hives. If this occurs, stop using the ointment immediately and wash off any remaining ointment. You may need to take some allergy medicine to relieve symptoms.    You may use acetaminophen or ibuprofen to control pain unless another pain medicine was prescribed. Talk with your healthcare provider before using these medicines if you have chronic liver or kidney disease or ever had a stomach ulcer or GI bleeding. Dont use ibuprofen in children younger than six months old.    Most skin wounds heal within 10 days. But an infection may occur even with treatment. So its important to watch the wound for signs of infection as listed below.  Follow-up care  Follow up with your healthcare provider, or as advised.  When to seek medical advice  Call your healthcare provider right away if any of these occur:    Fever of 100.4 F (38 C) or higher, or as directed by your healthcare provider    Increasing pain, redness, swelling, or drainage from the wound    Bleeding from the wound that does not stop after a few minutes of steady, firm pressure    Decreased ability to move any body part near the wound  Date Last Reviewed: 3/3/2017    [id number] [employer]. [address] [place] [address], [place] [address]. All rights reserved. This information is not intended as a  substitute for professional medical care. Always follow your healthcare professional's instructions.

## 2021-07-06 ASSESSMENT — PATIENT HEALTH QUESTIONNAIRE - PHQ9: SUM OF ALL RESPONSES TO PHQ QUESTIONS 1-9: 8

## 2021-07-08 ASSESSMENT — ANXIETY QUESTIONNAIRES: GAD7 TOTAL SCORE: 10

## 2021-07-14 PROBLEM — I50.41: Status: RESOLVED | Noted: 2021-01-15 | Resolved: 2021-01-20

## 2021-07-14 PROBLEM — I50.23 ACUTE ON CHRONIC SYSTOLIC HEART FAILURE (H): Status: RESOLVED | Noted: 2021-01-15 | Resolved: 2021-01-20

## 2021-09-23 ENCOUNTER — NURSE TRIAGE (OUTPATIENT)
Dept: NURSING | Facility: CLINIC | Age: 86
End: 2021-09-23

## 2021-09-23 NOTE — TELEPHONE ENCOUNTER
"Triage Call:    -Tiera \"Hilaria\" patients daughter calling. Hilaria's name is on the CTC, but this was NOT filled out appropriately, so RN cannot give out patient health information.    -Hilaria is NOT with patient currently, but is reporting possible urgent sx.     -Hilaria was advised to call back when she is with patient or to have patient call and speak with any triage RN. Hilaria stated she has already tried calling patient, but no answer.     -Hilaria states she is going to go over to patients house now and call back and speak with any nurse to triage sx symptoms and advise on best medical recommendations.     -RN advised if patient is too weak to stand, having shock symptoms, confusion/disorientation, slurred speech, severe difficulties breathing that Hilaria should call 911. Hilaria verbalized understanding and agrees with plan.     Jo Weaver RN, BSN Nurse Triage Advisor 9:50 AM 9/23/2021       Additional Information    [1] Caller is not with the adult (patient) AND [2] reporting urgent symptoms     Advised daughter to call back when with patient or have patient call triage nurse line.    Protocols used: INFORMATION ONLY CALL-A-    COVID 19 Nurse Triage Plan/Patient Instructions    Please be aware that novel coronavirus (COVID-19) may be circulating in the community. If you develop symptoms such as fever, cough, or SOB or if you have concerns about the presence of another infection including coronavirus (COVID-19), please contact your health care provider or visit [email or web address].     Disposition/Instructions    Additional COVID19 information to add for patients.   How can I protect others?  If you have symptoms (fever, cough, body aches or trouble breathing): Stay home and away from others (self-isolate) until:    At least 10 days have passed since your symptoms started, And     You ve had no fever--and no medicine that reduces fever--for 1 full day (24 hours), And      Your other symptoms have resolved " "(gotten better).     If you don t have symptoms, but a test showed that you have COVID-19 (you tested positive):    Stay home and away from others (self-isolate). Follow the tips under \"How do I self-isolate?\" below for 10 days (20 days if you have a weak immune system).    You don't need to be retested for COVID-19 before going back to school or work. As long as you're fever-free and feeling better, you can go back to school, work and other activities after waiting the 10 or 20 days.     How do I self-isolate?    Stay in your own room, even for meals. Use your own bathroom if you can.     Stay away from others in your home. No hugging, kissing or shaking hands. No visitors.    Don t go to work, school or anywhere else.     Clean  high touch  surfaces often (doorknobs, counters, handles, etc.). Use a household cleaning spray or wipes. You ll find a full list on the EPA website:  www.epa.gov/pesticide-registration/list-n-disinfectants-use-against-sars-cov-2.    Cover your mouth and nose with a mask, tissue or washcloth to avoid spreading germs.    Wash your hands and face often. Use soap and water.    Caregivers in these groups are at risk for severe illness due to COVID-19:  o People 65 years and older  o People who live in a nursing home or long-term care facility  o People with chronic disease (lung, heart, cancer, diabetes, kidney, liver, immunologic)  o People who have a weakened immune system, including those who:  - Are in cancer treatment  - Take medicine that weakens the immune system, such as corticosteroids  - Had a bone marrow or organ transplant  - Have an immune deficiency  - Have poorly controlled HIV or AIDS  - Are obese (body mass index of 40 or higher)  - Smoke regularly    Caregivers should wear gloves while washing dishes, handling laundry and cleaning bedrooms and bathrooms.    Use caution when washing and drying laundry: Don t shake dirty laundry, and use the warmest water setting that you " can.    For more tips, go to www.cdc.gov/coronavirus/2019-ncov/downloads/10Things.pdf.    How can I take care of myself?  1. Get lots of rest. Drink extra fluids (unless a doctor has told you not to).     2. Take Tylenol (acetaminophen) for fever or pain. If you have liver or kidney problems, ask your family doctor if it s okay to take Tylenol.     Adults can take either:     650 mg (two 325 mg pills) every 4 to 6 hours, or     1,000 mg (two 500 mg pills) every 8 hours as needed.     Note: Don t take more than 3,000 mg in one day.   Acetaminophen is found in many medicines (both prescribed and over-the-counter medicines). Read all labels to be sure you don t take too much.     For children, check the Tylenol bottle for the right dose. The dose is based on the child s age or weight.    3. If you have other health problems (like cancer, heart failure, an organ transplant or severe kidney disease): Call your specialty clinic if you don t feel better in the next 2 days.    4. Know when to call 911: Emergency warning signs include:    Trouble breathing or shortness of breath    Pain or pressure in the chest that doesn t go away    Feeling confused like you haven t felt before, or not being able to wake up    Bluish-colored lips or face    What are the symptoms of COVID-19?     The most common symptoms are cough, fever and trouble breathing.     Less common symptoms include body aches, chills, diarrhea (loose, watery poops), fatigue (feeling very tired), headache, runny nose, sore throat and loss of smell.    COVID-19 can cause severe coughing (bronchitis) and lung infection (pneumonia).    How does it spread?     The virus may spread when a person coughs or sneezes into the air. The virus can travel about 6 feet this way, and it can live on surfaces.      Common  (household disinfectants) will kill the virus.    Who is at risk?  Anyone can catch COVID-19 if they re around someone who has the virus.    How can  others protect themselves?     Stay away from people who have COVID-19 (or symptoms of COVID-19).    Wash hands often with soap and water. Or, use hand  with at least 60% alcohol.    Avoid touching the eyes, nose or mouth.     Wear a face mask when you go out in public, when sick or when caring for a sick person.    Where can I get more information?    [facility]: About COVID-19: [email or web address]    CDC: What to Do If You re Sick: www.cdc.gov/coronavirus/2019-ncov/about/steps-when-sick.html    CDC: Ending Home Isolation: www.cdc.gov/coronavirus/2019-ncov/hcp/disposition-in-home-patients.html     CDC: Caring for Someone: www.cdc.gov/coronavirus/2019-ncov/if-you-are-sick/care-for-someone.html     [facility]: Interim Guidance for Hospital Discharge to Home: www.health.[facility].[place]./diseases/coronavirus/hcp/hospdischarge.pdf    [facility] clinical trials (COVID-19 research studies): clinicalaffairs.[facility]-clinical-trials     Below are the COVID-19 hotlines at the [place] [facility] ([facility]). Interpreters are available.   o For health questions: Call 754-855-4262 or 1-470.856.7332 (7 a.m. to 7 p.m.)  o For questions about schools and childcare: Call 879-246-6254 or 1-720.686.7774 (7 a.m. to 7 p.m.)          Thank you for taking steps to prevent the spread of this virus.  o Limit your contact with others.  o Wear a simple mask to cover your cough.  o Wash your hands well and often.    Resources    [facility]: About COVID-19: [email or web address]    CDC: What to Do If You're Sick: www.cdc.gov/coronavirus/2019-ncov/about/steps-when-sick.html    CDC: Ending Home Isolation: www.cdc.gov/coronavirus/2019-ncov/hcp/disposition-in-home-patients.html     CDC: Caring for Someone: www.cdc.gov/coronavirus/2019-ncov/if-you-are-sick/care-for-someone.html     [facility]: Interim Guidance for Hospital Discharge to Home:  www.health.[facility].[place]/diseases/coronavirus/hcp/hospdischarge.pdf    [facility] clinical trials (COVID-19 research studies): clinicalaffairs.[facility]/umn-clinical-trials     Below are the COVID-19 hotlines at the [facility] ([facility]). Interpreters are available.   o For health questions: Call 129-642-0978 or 1-880.700.2508 (7 a.m. to 7 p.m.)  o For questions about schools and childcare: Call 460-457-2982 or 1-550.612.5930 (7 a.m. to 7 p.m.)

## 2021-10-06 ENCOUNTER — MEDICAL CORRESPONDENCE (OUTPATIENT)
Dept: HEALTH INFORMATION MANAGEMENT | Facility: CLINIC | Age: 86
End: 2021-10-06

## 2021-10-07 ENCOUNTER — MEDICAL CORRESPONDENCE (OUTPATIENT)
Dept: HEALTH INFORMATION MANAGEMENT | Facility: CLINIC | Age: 86
End: 2021-10-07

## 2021-10-13 ENCOUNTER — MEDICAL CORRESPONDENCE (OUTPATIENT)
Dept: HEALTH INFORMATION MANAGEMENT | Facility: CLINIC | Age: 86
End: 2021-10-13

## 2021-10-14 ENCOUNTER — MEDICAL CORRESPONDENCE (OUTPATIENT)
Dept: HEALTH INFORMATION MANAGEMENT | Facility: CLINIC | Age: 86
End: 2021-10-14

## 2021-10-21 ENCOUNTER — MEDICAL CORRESPONDENCE (OUTPATIENT)
Dept: HEALTH INFORMATION MANAGEMENT | Facility: CLINIC | Age: 86
End: 2021-10-21

## 2021-11-04 ENCOUNTER — TELEPHONE (OUTPATIENT)
Dept: INTERNAL MEDICINE | Facility: CLINIC | Age: 86
End: 2021-11-04

## 2021-11-04 NOTE — TELEPHONE ENCOUNTER
Reason for Call:  Other call back    Detailed comments: Hospice calling to notify of patient death of 11/03/2021 at home time of death 6:30p.m.    Phone Number Patient can be reached at: Other phone number:  [phone]    Best Time: anytime    Can we leave a detailed message on this number? YES    Call taken on 11/4/2021 at 8:38 AM by Iman Kim